# Patient Record
Sex: FEMALE | Race: ASIAN | Employment: OTHER | URBAN - METROPOLITAN AREA
[De-identification: names, ages, dates, MRNs, and addresses within clinical notes are randomized per-mention and may not be internally consistent; named-entity substitution may affect disease eponyms.]

---

## 2023-01-21 ENCOUNTER — HOSPITAL ENCOUNTER (OUTPATIENT)
Dept: ULTRASOUND IMAGING | Facility: HOSPITAL | Age: 78
Discharge: HOME | End: 2023-01-21

## 2023-01-21 DIAGNOSIS — I15.2 HYPERTENSION ASSOCIATED WITH DIABETES (HCC): ICD-10-CM

## 2023-01-21 DIAGNOSIS — E11.59 HYPERTENSION ASSOCIATED WITH DIABETES (HCC): ICD-10-CM

## 2024-06-05 ENCOUNTER — OFFICE VISIT (OUTPATIENT)
Dept: FAMILY MEDICINE CLINIC | Facility: CLINIC | Age: 79
End: 2024-06-05
Payer: COMMERCIAL

## 2024-06-05 VITALS
BODY MASS INDEX: 24.29 KG/M2 | OXYGEN SATURATION: 99 % | DIASTOLIC BLOOD PRESSURE: 60 MMHG | HEIGHT: 57 IN | HEART RATE: 60 BPM | SYSTOLIC BLOOD PRESSURE: 120 MMHG | WEIGHT: 112.6 LBS | TEMPERATURE: 97 F

## 2024-06-05 DIAGNOSIS — I10 HYPERTENSION, UNSPECIFIED TYPE: ICD-10-CM

## 2024-06-05 DIAGNOSIS — Z53.20 SCREENING FOR HEPATITIS C DECLINED: ICD-10-CM

## 2024-06-05 DIAGNOSIS — E11.9 TYPE 2 DIABETES MELLITUS WITHOUT COMPLICATION, UNSPECIFIED WHETHER LONG TERM INSULIN USE (HCC): Primary | ICD-10-CM

## 2024-06-05 PROCEDURE — 99213 OFFICE O/P EST LOW 20 MIN: CPT

## 2024-06-05 RX ORDER — GLIMEPIRIDE 2 MG/1
2 TABLET ORAL
COMMUNITY

## 2024-06-05 RX ORDER — DIPHENOXYLATE HYDROCHLORIDE AND ATROPINE SULFATE 2.5; .025 MG/1; MG/1
1 TABLET ORAL DAILY
COMMUNITY

## 2024-06-05 RX ORDER — NEBIVOLOL 10 MG/1
10 TABLET ORAL DAILY
COMMUNITY

## 2024-06-05 NOTE — ASSESSMENT & PLAN NOTE
Patient has a history of type 2 diabetes currently taking glimepiride  Last A1c unknown  -Patient does endorse that she feels increased thirst and wakes multiple times throughout the night to urinate    Plan:  Labs including CBC CMP and A1c ordered  Encouraged continued physical activity and dietary modifications   Continue glimepiride, patient has this medication from prior PCP  Referral to ophthalmology provided  Plan to follow-up in 3 months  -Foot exam and urine protein to be done

## 2024-06-05 NOTE — ASSESSMENT & PLAN NOTE
Patient has a history of hypertension controlled on nebivolol  In office Blood Pressure: 120/60    Plan:  Will attempt to obtain previous medical records  Continue nebivolol, patient currently has this medication from previous provider  Encouraged monitoring of home blood pressures with cuff  Plan to follow-up in 3 months

## 2024-06-05 NOTE — PROGRESS NOTES
Ambulatory Visit  Name: Carlene Cuenca      : 1945      MRN: [id number]  Encounter Provider: Brigid Patel DO  Encounter Date: 2024   Encounter department: [facility]    Assessment & Plan   1. Type 2 diabetes mellitus without complication, unspecified whether long term insulin use (HCC)  Assessment & Plan:  Patient has a history of type 2 diabetes currently taking glimepiride  -Patient does endorse that she feels increased thirst and wakes multiple times throughout the night to urinate    Plan:  Labs including CBC CMP and A1c ordered  Encouraged continued physical activity and dietary modifications   Continue glimepiride, patient has this medication from prior PCP  Referral to ophthalmology provided  Plan to follow-up in 3 months  -Foot exam and urine protein to be done  Orders:  -     CBC and differential; Future  -     Comprehensive metabolic panel; Future  -     Ambulatory Referral to Ophthalmology; Future  -     Hemoglobin A1C; Future  2. Hypertension, unspecified type  Assessment & Plan:  Patient has a history of hypertension controlled on nebivolol  In office Blood Pressure: 120/60    Plan:  Will attempt to obtain previous medical records  Continue nebivolol, patient currently has this medication from previous provider  Encouraged monitoring of home blood pressures with cuff  Plan to follow-up in 3 months    Orders:  -     CBC and differential; Future  -     Comprehensive metabolic panel; Future  3. Screening for hepatitis C declined       History of Present Illness     HPI  78-year-old female presents with her daughter to establish care.  She previously received care in [place] but has moved in with her daughter who lives locally.  She reports that she is overall in good health with a past medical history of diabetes and hypertension.  She walks 1 to 2 miles on most days and eats a vegetarian and primarily plant-based diet.  She does endorse that she always drinks  "a lot of water and wakes up 2-3 time each night to urinate.  Typically she sleeps 7-8 yours per night.     Her daughter also notes that the patient occasionally has a tremor in her left hand that typically occurs when she is anxious. It is not worse when picking up objects and does not disrupt her daily functioning. No associated weakness or numbness.     Dentist: Dentures, does not have established dentist  Optometrist: wears glasses      Review of Systems   Constitutional:  Negative for activity change, appetite change, fever and unexpected weight change.   Eyes:  Negative for visual disturbance.   Respiratory:  Negative for chest tightness and shortness of breath.    Cardiovascular:  Negative for chest pain, palpitations and leg swelling.   Gastrointestinal:  Negative for abdominal pain, constipation, diarrhea and vomiting.   Genitourinary:  Negative for dysuria.   Skin:  Negative for rash.   Neurological:  Negative for syncope.   Additional ROS per HPI above    Family Hx:  Mother - diabetes    Objective     /60 (BP Location: Left arm, Patient Position: Sitting, Cuff Size: Standard)   Pulse 60   Temp (!) 97 °F (36.1 °C) (Tympanic)   Ht 4' 9\" (1.448 m)   Wt 51.1 kg (112 lb 9.6 oz)   SpO2 99%   BMI 24.37 kg/m²     Physical Exam  Vitals reviewed.   Constitutional:       General: She is not in acute distress.     Appearance: She is not ill-appearing.   HENT:      Head: Normocephalic and atraumatic.      Left Ear: External ear normal.      Nose: Nose normal. No congestion or rhinorrhea.      Mouth/Throat:      Mouth: Mucous membranes are moist.      Pharynx: No oropharyngeal exudate or posterior oropharyngeal erythema.   Eyes:      General: No scleral icterus.        Right eye: No discharge.         Left eye: No discharge.      Extraocular Movements: Extraocular movements intact.      Conjunctiva/sclera: Conjunctivae normal.   Cardiovascular:      Rate and Rhythm: Normal rate and regular rhythm.      Heart " sounds: Normal heart sounds. No murmur heard.  Pulmonary:      Effort: Pulmonary effort is normal. No respiratory distress.      Breath sounds: Normal breath sounds. No stridor. No wheezing, rhonchi or rales.   Abdominal:      General: Bowel sounds are normal. There is no distension.      Palpations: Abdomen is soft.      Tenderness: There is no abdominal tenderness. There is no guarding or rebound.   Musculoskeletal:      Cervical back: Neck supple.      Right lower leg: No edema.      Left lower leg: No edema.   Skin:     General: Skin is warm and dry.      Capillary Refill: Capillary refill takes less than 2 seconds.      Findings: No rash.   Neurological:      Mental Status: She is alert and oriented to person, place, and time.   Psychiatric:         Mood and Affect: Mood normal.         Behavior: Behavior normal.       Administrative Statements   I have spent a total time of 30 minutes on 06/05/24 In caring for this patient including Patient and family education, Documenting in the medical record, Reviewing / ordering tests, medicine, procedures  , and Obtaining or reviewing history  .

## 2024-06-07 ENCOUNTER — APPOINTMENT (OUTPATIENT)
Dept: LAB | Facility: CLINIC | Age: 79
End: 2024-06-07
Payer: COMMERCIAL

## 2024-06-07 DIAGNOSIS — E11.9 TYPE 2 DIABETES MELLITUS WITHOUT COMPLICATION, UNSPECIFIED WHETHER LONG TERM INSULIN USE (HCC): ICD-10-CM

## 2024-06-07 DIAGNOSIS — I10 HYPERTENSION, UNSPECIFIED TYPE: ICD-10-CM

## 2024-06-07 LAB
ALBUMIN SERPL BCP-MCNC: 4 G/DL (ref 3.5–5)
ALP SERPL-CCNC: 69 U/L (ref 34–104)
ALT SERPL W P-5'-P-CCNC: 12 U/L (ref 7–52)
ANION GAP SERPL CALCULATED.3IONS-SCNC: 9 MMOL/L (ref 4–13)
AST SERPL W P-5'-P-CCNC: 20 U/L (ref 13–39)
BASOPHILS # BLD AUTO: 0.05 THOUSANDS/ÂΜL (ref 0–0.1)
BASOPHILS NFR BLD AUTO: 1 % (ref 0–1)
BILIRUB SERPL-MCNC: 0.66 MG/DL (ref 0.2–1)
BUN SERPL-MCNC: 22 MG/DL (ref 5–25)
CALCIUM SERPL-MCNC: 8.9 MG/DL (ref 8.4–10.2)
CHLORIDE SERPL-SCNC: 102 MMOL/L (ref 96–108)
CO2 SERPL-SCNC: 27 MMOL/L (ref 21–32)
CREAT SERPL-MCNC: 1.59 MG/DL (ref 0.6–1.3)
EOSINOPHIL # BLD AUTO: 0.32 THOUSAND/ÂΜL (ref 0–0.61)
EOSINOPHIL NFR BLD AUTO: 5 % (ref 0–6)
ERYTHROCYTE [DISTWIDTH] IN BLOOD BY AUTOMATED COUNT: 13.1 % (ref 11.6–15.1)
EST. AVERAGE GLUCOSE BLD GHB EST-MCNC: 166 MG/DL
GFR SERPL CREATININE-BSD FRML MDRD: 30 ML/MIN/1.73SQ M
GLUCOSE P FAST SERPL-MCNC: 198 MG/DL (ref 65–99)
HBA1C MFR BLD: 7.4 %
HCT VFR BLD AUTO: 35 % (ref 34.8–46.1)
HGB BLD-MCNC: 12.1 G/DL (ref 11.5–15.4)
IMM GRANULOCYTES # BLD AUTO: 0.02 THOUSAND/UL (ref 0–0.2)
IMM GRANULOCYTES NFR BLD AUTO: 0 % (ref 0–2)
LYMPHOCYTES # BLD AUTO: 2 THOUSANDS/ÂΜL (ref 0.6–4.47)
LYMPHOCYTES NFR BLD AUTO: 28 % (ref 14–44)
MCH RBC QN AUTO: 29.8 PG (ref 26.8–34.3)
MCHC RBC AUTO-ENTMCNC: 34.6 G/DL (ref 31.4–37.4)
MCV RBC AUTO: 86 FL (ref 82–98)
MONOCYTES # BLD AUTO: 0.5 THOUSAND/ÂΜL (ref 0.17–1.22)
MONOCYTES NFR BLD AUTO: 7 % (ref 4–12)
NEUTROPHILS # BLD AUTO: 4.25 THOUSANDS/ÂΜL (ref 1.85–7.62)
NEUTS SEG NFR BLD AUTO: 59 % (ref 43–75)
NRBC BLD AUTO-RTO: 0 /100 WBCS
PLATELET # BLD AUTO: 236 THOUSANDS/UL (ref 149–390)
PMV BLD AUTO: 10.9 FL (ref 8.9–12.7)
POTASSIUM SERPL-SCNC: 4.6 MMOL/L (ref 3.5–5.3)
PROT SERPL-MCNC: 6.4 G/DL (ref 6.4–8.4)
RBC # BLD AUTO: 4.06 MILLION/UL (ref 3.81–5.12)
SODIUM SERPL-SCNC: 138 MMOL/L (ref 135–147)
WBC # BLD AUTO: 7.14 THOUSAND/UL (ref 4.31–10.16)

## 2024-06-07 PROCEDURE — 80053 COMPREHEN METABOLIC PANEL: CPT

## 2024-06-07 PROCEDURE — 36415 COLL VENOUS BLD VENIPUNCTURE: CPT

## 2024-06-07 PROCEDURE — 83036 HEMOGLOBIN GLYCOSYLATED A1C: CPT

## 2024-06-07 PROCEDURE — 85025 COMPLETE CBC W/AUTO DIFF WBC: CPT

## 2024-06-11 ENCOUNTER — TELEPHONE (OUTPATIENT)
Dept: PEDIATRICS UNIT | Facility: HOSPITAL | Age: 79
End: 2024-06-11

## 2024-06-11 NOTE — TELEPHONE ENCOUNTER
Called patient and spoke with daughter about recent lab results. Discussed some dietary/lifestyle modifications that may reduce A1C and the potential for alterative medications. Also discussed monitoring kidney function overtime, as it now now appears at baseline. Will plan to follow up in 3 months with diabetic foot exam, Urine Alb:Cr and A1c.

## 2024-08-12 ENCOUNTER — CONSULT (OUTPATIENT)
Dept: FAMILY MEDICINE CLINIC | Facility: CLINIC | Age: 79
End: 2024-08-12
Payer: COMMERCIAL

## 2024-08-12 VITALS
TEMPERATURE: 98 F | RESPIRATION RATE: 20 BRPM | HEIGHT: 58 IN | HEART RATE: 63 BPM | DIASTOLIC BLOOD PRESSURE: 90 MMHG | SYSTOLIC BLOOD PRESSURE: 198 MMHG | WEIGHT: 114 LBS | OXYGEN SATURATION: 98 % | BODY MASS INDEX: 23.93 KG/M2

## 2024-08-12 DIAGNOSIS — Z13.220 SCREENING FOR LIPID DISORDERS: ICD-10-CM

## 2024-08-12 DIAGNOSIS — Z01.818 PRE-OP EXAMINATION: Primary | ICD-10-CM

## 2024-08-12 DIAGNOSIS — E11.9 TYPE 2 DIABETES MELLITUS WITHOUT COMPLICATION (HCC): ICD-10-CM

## 2024-08-12 DIAGNOSIS — I10 HYPERTENSION: ICD-10-CM

## 2024-08-12 DIAGNOSIS — Z01.818 PRE-OP EXAMINATION: ICD-10-CM

## 2024-08-12 PROCEDURE — 93000 ELECTROCARDIOGRAM COMPLETE: CPT | Performed by: FAMILY MEDICINE

## 2024-08-12 PROCEDURE — 99243 OFF/OP CNSLTJ NEW/EST LOW 30: CPT | Performed by: FAMILY MEDICINE

## 2024-08-12 RX ORDER — AMLODIPINE BESYLATE 5 MG/1
5 TABLET ORAL DAILY
Qty: 30 TABLET | Refills: 0 | Status: SHIPPED | OUTPATIENT
Start: 2024-08-12 | End: 2024-08-13 | Stop reason: SDUPTHER

## 2024-08-12 NOTE — PROGRESS NOTES
PRE-OPERATIVE EXAMINATION  Carlene Cuenca  1945    Carlene Cuenca is a 78 y.o. female with history of type 2 diabetes and hypertension who is planning to undergo cataract removal under IV sedation by Dr. Krysta Webster on 8/19/24. The procedure is indicated for the following condition: Right eye cataract. Patient has not had complications with anesthesia in the past. No prior surgeries, no hx of family reactions.     ROS:   Headaches: No  Chest pain: no   Shortness of breath: no  Shortness of breath with exertion: no  Orthopnea: no  Dizziness: no  Syncope: no  Unexplained weight change: no    PMH:  CAD: no  HTN: yes  CKD: no  DM: yes on insulin: no  History of CVA: no    She  reports that she has never smoked. She has never used smokeless tobacco. She reports that she does not drink alcohol.    There were no vitals taken for this visit.  Physical Exam  Vitals and nursing note reviewed.   Constitutional:       General: She is not in acute distress.     Appearance: She is well-developed.   HENT:      Head: Normocephalic and atraumatic.      Mouth/Throat:      Mouth: Mucous membranes are moist.      Pharynx: Oropharynx is clear.   Eyes:      Conjunctiva/sclera: Conjunctivae normal.   Cardiovascular:      Rate and Rhythm: Normal rate and regular rhythm.      Pulses:           Dorsalis pedis pulses are 2+ on the right side and 2+ on the left side.        Posterior tibial pulses are 2+ on the right side and 2+ on the left side.      Heart sounds: No murmur heard.  Pulmonary:      Effort: Pulmonary effort is normal. No respiratory distress.      Breath sounds: Normal breath sounds. No wheezing, rhonchi or rales.   Abdominal:      General: Bowel sounds are normal. There is no distension.      Palpations: Abdomen is soft.      Tenderness: There is no abdominal tenderness. There is no guarding or rebound.   Musculoskeletal:         General: No swelling.      Cervical back: Neck supple.      Right lower leg: No  edema.      Left lower leg: No edema.        Feet:    Feet:      Right foot:      Skin integrity: No ulcer, skin breakdown, erythema, warmth, callus or dry skin.      Left foot:      Skin integrity: No ulcer, skin breakdown, erythema, warmth, callus or dry skin.   Skin:     General: Skin is warm and dry.      Capillary Refill: Capillary refill takes less than 2 seconds.   Neurological:      Mental Status: She is alert.   Psychiatric:         Mood and Affect: Mood normal.   Diabetic Foot Exam    Patient's shoes and socks removed.    Right Foot/Ankle   Right Foot Inspection  Skin Exam: skin normal and skin intact. No dry skin, no warmth, no callus, no erythema, no maceration, no abnormal color, no pre-ulcer, no ulcer and no callus.     Toe Exam: No swelling and  no right toe deformity    Sensory   Vibration: intact  Monofilament testing: intact    Vascular  Capillary refills: < 3 seconds  The right DP pulse is 2+. The right PT pulse is 2+.     Left Foot/Ankle  Left Foot Inspection  Skin Exam: skin normal and skin intact. No dry skin, no warmth, no erythema, no maceration, normal color, no pre-ulcer, no ulcer and no callus.     Toe Exam: No swelling and no left toe deformity.     Sensory   Vibration: intact  Monofilament testing: intact    Vascular  Capillary refills: < 3 seconds  The left DP pulse is 2+. The left PT pulse is 2+.     Assign Risk Category  Risk: 0 (no deficits in monofilament detection)        Revised Cardiac Risk Index (RCRI) for Pre-Operative Risk   (estimates risk of cardiac complications after noncardiac surgery)    High-risk surgery: No 0 / Yes +1  Intraperitoneal, intrathoracic, suprainguinal vascular  History of ischemic heart disease: No 0 / Yes +1  Hx of MI, (+) exercise test, current chest pain considered due to myocardial ischemia, use of nitrate therapy or ECG with pathological Q waves)  History of CHF: No 0 / Yes +1  Pulmonary edema, B/L rales or S3 gallop; SOTO, orthopnea, PND, CXR showing  pulmonary vascular redistribution)  History of cerebrovascular disease: No 0 / Yes +1  Prior TIA or stroke  Pre-operative treatment with insulin: No 0 / Yes +1  Pre-operative creatinine >2 mg/dL: No 0 / Yes +1    RCRI Scorin points: Class I Risk, 3.9% 30-day risk of death, MI, or cardiac arrest  1 point: Class II Risk, 6.0% 30-day risk of death, MI, or cardiac arrest  2 points: Class III Risk, 10.1% 30-day risk of death, MI, or cardiac arrest  3 points: Class IV Risk, 15% 30-day risk of death, MI, or cardiac arrest  4 points: Class IV Risk, 15% 30-day risk of death, MI, or cardiac arrest  5 points: Class IV Risk, 15% 30-day risk of death, MI, or cardiac arrest  6 points: Class IV Risk, 15% 30-day risk of death, MI, or cardiac arrest    Lab Results   Component Value Date    CREATININE 1.59 (H) 2024       Diagnoses and all orders for this visit:    Pre-op examination    Type 2 diabetes mellitus without complication (HCC)    Hypertension    Screening for lipid disorders      Assessment and plan:  Pre- op examination    Type 2 Diabetes Mellitus without complication  Currently taking glimepiride 2 mg daily        Lab Results   Component Value Date     HGBA1C 7.4 (H) 2024      Plan:  Labs ordered today: CMP, alb/cr ratio  Patient may benefit from metformin vs SGLT2i due to concerns for hypoglycemia   Diabetic foot exam performed in office today      Hypertension  Controlled on nebivolol 10 mg daily  In office Blood Pressure: (!) 198/90  -Patient asymptomatic  -EKG showing NSR with no signs of ischemia     Plan:  Continue nebivolol at this time  Amlodipine 5 mg daily  Follow-up in 4 days for blood pressure recheck  Encourage monitoring BP with home cuff      Screening for Lipid disorders  - Lipid panel      Recommendations:  Carlene Cuenca is undergoing an elective Low Risk surgery, cataract removal. She is RCRI class I risk (0 points) with 3.9% 30-day risk of death, MI, or cardiac arrest.  Per patient  was noted to have significant hypertension in the office today.  She was started on amlodipine 5 mg daily with plans to follow-up in 4 days.  He is to complete preop blood work interval.    Discussed with Dr. Gordon, who is in agreement with the plan as outlined above.

## 2024-08-12 NOTE — PATIENT INSTRUCTIONS
Please complete blood work tomorrow. This is fasting blood work.   Please start amlodipine 5 mg daily  Please follow up in office on Thursday afternoon or Friday morning to discuss lab work and pre-operative risk

## 2024-08-12 NOTE — ASSESSMENT & PLAN NOTE
Currently taking glimepiride 2 mg daily  Lab Results   Component Value Date    HGBA1C 7.4 (H) 06/07/2024     Plan:  Labs ordered today: BMP, alb/cr ratio  Patient may benefit from metformin vs SGLT2i due to concerns for hypoglycemia

## 2024-08-13 ENCOUNTER — TELEPHONE (OUTPATIENT)
Dept: FAMILY MEDICINE CLINIC | Facility: CLINIC | Age: 79
End: 2024-08-13

## 2024-08-13 ENCOUNTER — APPOINTMENT (OUTPATIENT)
Dept: LAB | Facility: CLINIC | Age: 79
End: 2024-08-13
Payer: COMMERCIAL

## 2024-08-13 DIAGNOSIS — Z01.818 PRE-OP EXAMINATION: ICD-10-CM

## 2024-08-13 DIAGNOSIS — E11.9 TYPE 2 DIABETES MELLITUS WITHOUT COMPLICATION (HCC): ICD-10-CM

## 2024-08-13 DIAGNOSIS — Z13.220 SCREENING FOR LIPID DISORDERS: ICD-10-CM

## 2024-08-13 LAB
ALBUMIN SERPL BCG-MCNC: 4 G/DL (ref 3.5–5)
ALP SERPL-CCNC: 70 U/L (ref 34–104)
ALT SERPL W P-5'-P-CCNC: 10 U/L (ref 7–52)
ANION GAP SERPL CALCULATED.3IONS-SCNC: 8 MMOL/L (ref 4–13)
AST SERPL W P-5'-P-CCNC: 13 U/L (ref 13–39)
BILIRUB SERPL-MCNC: 0.62 MG/DL (ref 0.2–1)
BUN SERPL-MCNC: 19 MG/DL (ref 5–25)
CALCIUM SERPL-MCNC: 9.1 MG/DL (ref 8.4–10.2)
CHLORIDE SERPL-SCNC: 102 MMOL/L (ref 96–108)
CHOLEST SERPL-MCNC: 172 MG/DL
CO2 SERPL-SCNC: 28 MMOL/L (ref 21–32)
CREAT SERPL-MCNC: 1.46 MG/DL (ref 0.6–1.3)
CREAT UR-MCNC: 30.7 MG/DL
GFR SERPL CREATININE-BSD FRML MDRD: 34 ML/MIN/1.73SQ M
GLUCOSE P FAST SERPL-MCNC: 90 MG/DL (ref 65–99)
HDLC SERPL-MCNC: 36 MG/DL
LDLC SERPL CALC-MCNC: 95 MG/DL (ref 0–100)
MICROALBUMIN UR-MCNC: 84.4 MG/L
MICROALBUMIN/CREAT 24H UR: 275 MG/G CREATININE (ref 0–30)
POTASSIUM SERPL-SCNC: 4.1 MMOL/L (ref 3.5–5.3)
PROT SERPL-MCNC: 6.6 G/DL (ref 6.4–8.4)
SODIUM SERPL-SCNC: 138 MMOL/L (ref 135–147)
TRIGL SERPL-MCNC: 206 MG/DL

## 2024-08-13 PROCEDURE — 80053 COMPREHEN METABOLIC PANEL: CPT

## 2024-08-13 PROCEDURE — 36415 COLL VENOUS BLD VENIPUNCTURE: CPT

## 2024-08-13 PROCEDURE — 82570 ASSAY OF URINE CREATININE: CPT

## 2024-08-13 PROCEDURE — 82043 UR ALBUMIN QUANTITATIVE: CPT

## 2024-08-13 PROCEDURE — 80061 LIPID PANEL: CPT

## 2024-08-13 RX ORDER — AMLODIPINE BESYLATE 5 MG/1
5 TABLET ORAL DAILY
Qty: 30 TABLET | Refills: 0 | Status: SHIPPED | OUTPATIENT
Start: 2024-08-13 | End: 2024-08-14 | Stop reason: SDUPTHER

## 2024-08-13 RX ORDER — AMLODIPINE BESYLATE 5 MG/1
5 TABLET ORAL DAILY
Qty: 30 TABLET | Refills: 0 | OUTPATIENT
Start: 2024-08-13

## 2024-08-13 NOTE — TELEPHONE ENCOUNTER
Encounter for forms      Patient requires a form to be completed.  Patient is aware of 7-10 day turn around time.    Please refer to the following information:       Type of Form: [facility] for [facility]     Date of Visit (if applicable):     Doctor: dr cooper     Expected date: timely manner     How patient would like to receive form:when completed please fax    Fax number: [phone]    Patient phone number:       Copy scanned to encounter. Copy provided to patient. Original in (X) team folder to be completed.

## 2024-08-13 NOTE — TELEPHONE ENCOUNTER
Pts daughter called refill line stating that pharmacy told her Dr. Patel is not covered as a prescriber under her insurance. Can another provider in the office call this script in?    Isamar states she will wait around at the pharmacy for another 5 minutes and if it isn't call in before she leaves she will wait for the pharmacy to contact her

## 2024-08-13 NOTE — TELEPHONE ENCOUNTER
Patient daughter informed at this time I was informed by daughter they could not fill medication due to medicare enrollment. Of dr cooper. I gave attending name and it went thru daughter made aware.

## 2024-08-13 NOTE — TELEPHONE ENCOUNTER
I am following up patient had blood work completed prior to start of amlodipine start for today. Creatinine is 1.46 I wanted to schedk if patient needed to be notiifed to start the med after bw completion. Please review and provide input. Thank you

## 2024-08-14 DIAGNOSIS — Z01.818 PRE-OP EXAMINATION: ICD-10-CM

## 2024-08-14 RX ORDER — AMLODIPINE BESYLATE 5 MG/1
5 TABLET ORAL DAILY
Qty: 30 TABLET | Refills: 0 | Status: SHIPPED | OUTPATIENT
Start: 2024-08-14 | End: 2024-08-15

## 2024-08-15 ENCOUNTER — OFFICE VISIT (OUTPATIENT)
Dept: FAMILY MEDICINE CLINIC | Facility: CLINIC | Age: 79
End: 2024-08-15
Payer: COMMERCIAL

## 2024-08-15 VITALS
RESPIRATION RATE: 20 BRPM | OXYGEN SATURATION: 99 % | DIASTOLIC BLOOD PRESSURE: 82 MMHG | SYSTOLIC BLOOD PRESSURE: 198 MMHG | TEMPERATURE: 98 F | BODY MASS INDEX: 24.24 KG/M2 | HEART RATE: 66 BPM | WEIGHT: 114 LBS

## 2024-08-15 DIAGNOSIS — N18.32 STAGE 3B CHRONIC KIDNEY DISEASE (HCC): ICD-10-CM

## 2024-08-15 DIAGNOSIS — I10 PRIMARY HYPERTENSION: Primary | ICD-10-CM

## 2024-08-15 PROCEDURE — 99213 OFFICE O/P EST LOW 20 MIN: CPT

## 2024-08-15 RX ORDER — AMLODIPINE BESYLATE 10 MG/1
10 TABLET ORAL DAILY
Qty: 30 TABLET | Refills: 2 | Status: SHIPPED | OUTPATIENT
Start: 2024-08-15

## 2024-08-15 NOTE — ASSESSMENT & PLAN NOTE
Seen 3d ago with BP comparable to today's 198/82. At the time pt was taking on nebivolol 10mg QD. She was started on amlodipine 5mg which she took that night and has been taking daily since. She does drink 1 cup of coffee every morning. Pt's daughter reports they have a BP cuff at home if needed.  - Increase amlodipine to 10mg QD. Discussed potential side effects alejandro potential for lightheadedness if BP corrects rapidly  - Check BP first thing in the AM every AM over the next week and write the values down to bring to follow up  - Return within 1 week for BP recheck  - Pt advised to delay cataract surgery with currently uncontrolled HTN. Preop paperwork to be sent once BP controlled

## 2024-08-15 NOTE — ASSESSMENT & PLAN NOTE
Lab Results   Component Value Date    EGFR 34 08/13/2024    EGFR 30 06/07/2024    CREATININE 1.46 (H) 08/13/2024    CREATININE 1.59 (H) 06/07/2024   eGFR persistently in 30s, in setting of diabetes and uncontrolled HTN.  - Referral to establish with nephrology

## 2024-08-15 NOTE — PROGRESS NOTES
Ambulatory Visit  Name: Carlene Cuenca      : 1945      MRN: [id number]  Encounter Provider: Celestine Alcala MD  Encounter Date: 8/15/2024   Encounter department: [facility]    Assessment & Plan   1. Primary hypertension  Assessment & Plan:  Seen 3d ago with BP comparable to today's 198/82. At the time pt was taking on nebivolol 10mg QD. She was started on amlodipine 5mg which she took that night and has been taking daily since. She does drink 1 cup of coffee every morning. Pt's daughter reports they have a BP cuff at home if needed.  - Increase amlodipine to 10mg QD. Discussed potential side effects alejandro potential for lightheadedness if BP corrects rapidly  - Check BP first thing in the AM every AM over the next week and write the values down to bring to follow up  - Return within 1 week for BP recheck  - Pt advised to delay cataract surgery with currently uncontrolled HTN. Preop paperwork to be sent once BP controlled  Orders:  -     amLODIPine (NORVASC) 10 mg tablet; Take 1 tablet (10 mg total) by mouth daily  2. Stage 3b chronic kidney disease (HCC)  Assessment & Plan:  Lab Results   Component Value Date    EGFR 34 2024    EGFR 30 2024    CREATININE 1.46 (H) 2024    CREATININE 1.59 (H) 2024   eGFR persistently in 30s, in setting of diabetes and uncontrolled HTN.  - Referral to establish with nephrology  Orders:  -     Ambulatory Referral to Nephrology; Future       History of Present Illness     HPI  Presenting for BP recheck following preop exam earlier this week.  Took something other than nebivolol before in , believes it is amlodipine. Been on nebivolol for 8 months. Amlodpinine 5 started 2d ago.    Review of Systems   Constitutional:  Negative for chills and fever.   HENT:  Negative for ear pain and sore throat.    Eyes:  Negative for pain and visual disturbance.   Respiratory:  Negative for cough and shortness of breath.    Cardiovascular:   Negative for chest pain and palpitations.   Gastrointestinal:  Negative for abdominal pain and vomiting.   Genitourinary:  Negative for dysuria and hematuria.   Musculoskeletal:  Negative for arthralgias and back pain.   Skin:  Negative for color change and rash.   Neurological:  Negative for seizures and syncope.     Medical History Reviewed by provider this encounter:       Past Medical History   No past medical history on file.  No past surgical history on file.  No family history on file.  Current Outpatient Medications on File Prior to Visit   Medication Sig Dispense Refill    glimepiride (AMARYL) 2 mg tablet Take 2 mg by mouth every morning before breakfast      multivitamin (THERAGRAN) TABS Take 1 tablet by mouth daily      nebivolol (BYSTOLIC) 10 mg tablet Take 10 mg by mouth daily 2 times a day      [DISCONTINUED] amLODIPine (NORVASC) 5 mg tablet Take 1 tablet (5 mg total) by mouth daily 30 tablet 0     No current facility-administered medications on file prior to visit.   No Known Allergies   Current Outpatient Medications on File Prior to Visit   Medication Sig Dispense Refill    glimepiride (AMARYL) 2 mg tablet Take 2 mg by mouth every morning before breakfast      multivitamin (THERAGRAN) TABS Take 1 tablet by mouth daily      nebivolol (BYSTOLIC) 10 mg tablet Take 10 mg by mouth daily 2 times a day      [DISCONTINUED] amLODIPine (NORVASC) 5 mg tablet Take 1 tablet (5 mg total) by mouth daily 30 tablet 0     No current facility-administered medications on file prior to visit.      Social History     Tobacco Use    Smoking status: Never     Passive exposure: Never    Smokeless tobacco: Never   Vaping Use    Vaping status: Never Used   Substance and Sexual Activity    Alcohol use: Never    Drug use: Not on file    Sexual activity: Not on file     Objective     BP (!) 198/82 (BP Location: Left arm, Patient Position: Sitting, Cuff Size: Standard)   Pulse 66   Temp 98 °F (36.7 °C)   Resp 20   Wt 51.7 kg  (114 lb)   SpO2 99%   BMI 24.24 kg/m²     Physical Exam  Vitals and nursing note reviewed.   Constitutional:       General: She is not in acute distress.     Appearance: She is well-developed.   HENT:      Head: Normocephalic and atraumatic.   Eyes:      Conjunctiva/sclera: Conjunctivae normal.   Cardiovascular:      Rate and Rhythm: Normal rate and regular rhythm.      Heart sounds: No murmur heard.  Pulmonary:      Effort: Pulmonary effort is normal. No respiratory distress.      Breath sounds: Normal breath sounds.   Abdominal:      General: Abdomen is flat. There is no distension.   Musculoskeletal:         General: No swelling.   Skin:     General: Skin is warm and dry.      Capillary Refill: Capillary refill takes less than 2 seconds.   Neurological:      Mental Status: She is alert.   Psychiatric:         Mood and Affect: Mood normal.       Administrative Statements   I have spent a total time of 20 minutes in caring for this patient on the day of the visit/encounter including Instructions for management, Patient and family education, Counseling / Coordination of care, Documenting in the medical record, and Obtaining or reviewing history  .

## 2024-08-23 ENCOUNTER — TELEPHONE (OUTPATIENT)
Age: 79
End: 2024-08-23

## 2024-08-23 DIAGNOSIS — I10 PRIMARY HYPERTENSION: ICD-10-CM

## 2024-08-23 NOTE — TELEPHONE ENCOUNTER
Pt's daughter calling regarding rx of amLODIpine; stated she is at pharmacy and they advised her the the prescriber's NPI is not found.    Please have an attending physician resend rx through to [facility]/pharmacy #[id number] - [place] [address] [phone] and contact pt's daughter to advise once sent.

## 2024-08-26 ENCOUNTER — OFFICE VISIT (OUTPATIENT)
Dept: FAMILY MEDICINE CLINIC | Facility: CLINIC | Age: 79
End: 2024-08-26

## 2024-08-26 VITALS
OXYGEN SATURATION: 99 % | BODY MASS INDEX: 25.18 KG/M2 | DIASTOLIC BLOOD PRESSURE: 74 MMHG | HEART RATE: 70 BPM | WEIGHT: 118.4 LBS | TEMPERATURE: 98.7 F | RESPIRATION RATE: 20 BRPM | SYSTOLIC BLOOD PRESSURE: 180 MMHG

## 2024-08-26 DIAGNOSIS — I10 PRIMARY HYPERTENSION: Primary | ICD-10-CM

## 2024-08-26 DIAGNOSIS — M79.89 LEG SWELLING: ICD-10-CM

## 2024-08-26 DIAGNOSIS — N18.32 STAGE 3B CHRONIC KIDNEY DISEASE (HCC): ICD-10-CM

## 2024-08-26 RX ORDER — AMLODIPINE BESYLATE 10 MG/1
10 TABLET ORAL DAILY
Qty: 30 TABLET | Refills: 2 | Status: SHIPPED | OUTPATIENT
Start: 2024-08-26 | End: 2024-08-27

## 2024-08-26 RX ORDER — HYDROCHLOROTHIAZIDE 25 MG/1
25 TABLET ORAL DAILY
Qty: 90 TABLET | Refills: 1 | Status: SHIPPED | OUTPATIENT
Start: 2024-08-26

## 2024-08-26 NOTE — PROGRESS NOTES
Ambulatory Visit  Name: Carlene Cuenca      : 1945      MRN: [id number]  Encounter Provider: Chris Martinez DO  Encounter Date: 2024   Encounter department: [facility]    Assessment & Plan   1. Primary hypertension  Assessment & Plan:  Poor control blood pressure in office today 180/74.  Patient is currently taking nebivolol 10 mg daily.  As well as amlodipine 5 mg daily for the last few days.  For about 1 week patient was taking amlodipine 10 mg daily which she immediately noticed leg swelling which has improved since dropping back down to 5 mg daily.    Continue taking amlodipine 5 mg daily and nebivolol 10 mg daily  Add hydrochlorothiazide 25 mg daily  Repeat CMP in 2 weeks and follow-up in office for blood pressure recheck  Orders:  -     Comprehensive metabolic panel; Future  -     hydroCHLOROthiazide 25 mg tablet; Take 1 tablet (25 mg total) by mouth daily  -     amLODIPine (NORVASC) 10 mg tablet; Take 0.5 tablets (5 mg total) by mouth daily  2. Stage 3b chronic kidney disease (HCC)  Assessment & Plan:  Lab Results   Component Value Date    EGFR 34 2024    EGFR 30 2024    CREATININE 1.46 (H) 2024    CREATININE 1.59 (H) 2024     Calculated Creatine clearance 27ml/min    Patient is on the threshold for initiating ace/arb (would ideally like gfr greater than 30). Patient has scheduled establish care with nephrology, provided number for scheduling again. Uncontolled hypertension group attributing as well see above.    Creatinine clearance is greater than 10 so will initiate hydrochlorothiazide at this time for better blood pressure control.    Repeat CMP before follow-up visit to ensure no change in GFR/creatinine clearance  Orders:  -     Comprehensive metabolic panel; Future  3. Leg swelling  Comments:  patient notes leg swelling begining on 24. wiliam due to increase in amlodipine see above       History of Present Illness     Patient  presents to the office with leg swelling that has been ongoing for approximately 1 week.  Patient notes this started approximately few days after she started the increased dose of amlodipine at 10 mg daily.  Patient states that a few days ago she also noticed hand swelling and facial swelling which has resolved.  Leg swelling has been persistent throughout  The course.  Patient denies any chest pain, shortness of breath, dizziness, lightheadedness.  Patient denies any allergies or coming in contact with any substances.  Patient denies any itching.  Over the last few days patient has gone back to her original amlodipine dose of 5 mg which remote mild improvement of leg swelling as well as resolution of her hand and facial swelling.  Patient has not trialed any interventions or medications for the swelling        Review of Systems   Constitutional:  Negative for chills, fatigue and fever.   Respiratory:  Negative for chest tightness, shortness of breath and wheezing.    Cardiovascular:  Positive for leg swelling. Negative for chest pain.   Gastrointestinal:  Negative for abdominal pain, blood in stool, diarrhea and nausea.   Genitourinary:  Negative for difficulty urinating and dysuria.   Neurological:  Negative for dizziness, weakness and light-headedness.   Psychiatric/Behavioral:  Negative for behavioral problems and suicidal ideas.      Pertinent Medical History     Medical History Reviewed by provider this encounter:  Tobacco  Allergies  Meds  Problems  Med Hx  Surg Hx  Fam Hx       Past Medical History   History reviewed. No pertinent past medical history.  History reviewed. No pertinent surgical history.  History reviewed. No pertinent family history.  Current Outpatient Medications on File Prior to Visit   Medication Sig Dispense Refill    glimepiride (AMARYL) 2 mg tablet Take 2 mg by mouth every morning before breakfast      multivitamin (THERAGRAN) TABS Take 1 tablet by mouth daily      nebivolol  (BYSTOLIC) 10 mg tablet Take 10 mg by mouth daily 2 times a day      [DISCONTINUED] amLODIPine (NORVASC) 10 mg tablet Take 1 tablet (10 mg total) by mouth daily 30 tablet 2     No current facility-administered medications on file prior to visit.   No Known Allergies   Current Outpatient Medications on File Prior to Visit   Medication Sig Dispense Refill    glimepiride (AMARYL) 2 mg tablet Take 2 mg by mouth every morning before breakfast      multivitamin (THERAGRAN) TABS Take 1 tablet by mouth daily      nebivolol (BYSTOLIC) 10 mg tablet Take 10 mg by mouth daily 2 times a day      [DISCONTINUED] amLODIPine (NORVASC) 10 mg tablet Take 1 tablet (10 mg total) by mouth daily 30 tablet 2     No current facility-administered medications on file prior to visit.      Social History     Tobacco Use    Smoking status: Never     Passive exposure: Never    Smokeless tobacco: Never   Vaping Use    Vaping status: Never Used   Substance and Sexual Activity    Alcohol use: Never    Drug use: Not on file    Sexual activity: Not on file     Objective     BP (!) 180/74 (BP Location: Left arm, Patient Position: Sitting, Cuff Size: Large)   Pulse 70   Temp 98.7 °F (37.1 °C)   Resp 20   Wt 53.7 kg (118 lb 6.4 oz)   SpO2 99%   BMI 25.18 kg/m²     Physical Exam  Constitutional:       General: She is not in acute distress.     Appearance: She is not ill-appearing or toxic-appearing.   HENT:      Head: Normocephalic and atraumatic.      Right Ear: External ear normal.      Left Ear: External ear normal.      Nose: No congestion or rhinorrhea.      Mouth/Throat:      Pharynx: No oropharyngeal exudate or posterior oropharyngeal erythema.   Eyes:      Conjunctiva/sclera: Conjunctivae normal.   Cardiovascular:      Rate and Rhythm: Normal rate and regular rhythm.      Pulses: Normal pulses.      Heart sounds: Normal heart sounds. No murmur heard.  Pulmonary:      Effort: Pulmonary effort is normal. No respiratory distress.      Breath  sounds: Normal breath sounds. No wheezing or rales.   Abdominal:      General: Bowel sounds are normal. There is no distension.      Palpations: Abdomen is soft.      Tenderness: There is no abdominal tenderness. There is no guarding.   Musculoskeletal:      Right lower leg: Edema present.      Left lower leg: Edema present.   Skin:     General: Skin is warm.      Capillary Refill: Capillary refill takes less than 2 seconds.   Neurological:      General: No focal deficit present.      Mental Status: She is alert and oriented to person, place, and time. Mental status is at baseline.   Psychiatric:         Mood and Affect: Mood normal.         Behavior: Behavior normal.       Administrative Statements   I have spent a total time of 30 minutes in caring for this patient on the day of the visit/encounter including Risks and benefits of tx options, Impressions, Counseling / Coordination of care, Documenting in the medical record, Reviewing / ordering tests, medicine, procedures  , Obtaining or reviewing history  , and Communicating with other healthcare professionals .    Chris Martinez, DO  PGY-2 Family Medicine

## 2024-08-27 ENCOUNTER — TELEPHONE (OUTPATIENT)
Age: 79
End: 2024-08-27

## 2024-08-27 RX ORDER — AMLODIPINE BESYLATE 10 MG/1
5 TABLET ORAL DAILY
Qty: 30 TABLET | Refills: 2 | Status: SHIPPED | OUTPATIENT
Start: 2024-08-27 | End: 2024-08-30 | Stop reason: SDUPTHER

## 2024-08-27 NOTE — TELEPHONE ENCOUNTER
Patient's Daughter calling in :  As per last OV note Amlodipine should be 5 mg. Not 10 mg. If dosage can be changed .   States the pharmacy is having issues getting it approved through medicaid . Stating it may be because it was ordered by a resident and not an attending.     Please assist.

## 2024-08-27 NOTE — ASSESSMENT & PLAN NOTE
Lab Results   Component Value Date    EGFR 34 08/13/2024    EGFR 30 06/07/2024    CREATININE 1.46 (H) 08/13/2024    CREATININE 1.59 (H) 06/07/2024     Calculated Creatine clearance 27ml/min    Patient is on the threshold for initiating ace/arb (would ideally like gfr greater than 30). Patient has scheduled establish care with nephrology, provided number for scheduling again. Uncontolled hypertension group attributing as well see above.    Creatinine clearance is greater than 10 so will initiate hydrochlorothiazide at this time for better blood pressure control.    Repeat CMP before follow-up visit to ensure no change in GFR/creatinine clearance

## 2024-08-27 NOTE — ASSESSMENT & PLAN NOTE
Poor control blood pressure in office today 180/74.  Patient is currently taking nebivolol 10 mg daily.  As well as amlodipine 5 mg daily for the last few days.  For about 1 week patient was taking amlodipine 10 mg daily which she immediately noticed leg swelling which has improved since dropping back down to 5 mg daily.    Continue taking amlodipine 5 mg daily and nebivolol 10 mg daily  Add hydrochlorothiazide 25 mg daily  Repeat CMP in 2 weeks and follow-up in office for blood pressure recheck

## 2024-08-28 NOTE — TELEPHONE ENCOUNTER
I left message for daughter confirming correct amlodipine dose of 5 mg. I have also called the [facility] with an attending name but they were closed I told them to call back if additional info is needed.   My question to daughter is does she need additional amlodipine since dose was decreased she could split the pill.

## 2024-08-29 ENCOUNTER — TELEPHONE (OUTPATIENT)
Age: 79
End: 2024-08-29

## 2024-08-29 NOTE — TELEPHONE ENCOUNTER
Daughter called asking if a new prescription can be sent to the pharmacy for amLODIPine  5 mg and to be signed by an attending provider so medicaid could cover it. They did not  the 10 mg prescription sent on 08/27/24. Please advise and contact pt.

## 2024-08-30 DIAGNOSIS — I10 PRIMARY HYPERTENSION: ICD-10-CM

## 2024-08-30 RX ORDER — AMLODIPINE BESYLATE 5 MG/1
5 TABLET ORAL DAILY
Qty: 90 TABLET | Refills: 2 | Status: SHIPPED | OUTPATIENT
Start: 2024-08-30

## 2024-09-04 NOTE — TELEPHONE ENCOUNTER
Patient daughter called and stated that when she picked up the 5mg medication the pharmacy only gave them 10 pills. She hasn't had any to take since Sunday and is asking for a new prescription to be sent to the pharmacy.      She is also questioning on whether or not patient should be taking the hydroCHLOROthiazide 25 mg tablet as patient did stop taking it because it was giving her stomach cramps but patient daughter reported that patient's blood pressure has been as high as 199. If she is not to take the hydroCHLOROthiazide 25 mg tablet is there another medication that could/should be prescribed in place of it.    Please advise and call patient daughter.

## 2024-09-04 NOTE — TELEPHONE ENCOUNTER
I spoke with the patient daughter and I went over symptoms warranting ER visit if BP remains in the 199 range. I also suggested she take the meds as prescribed. I am not sure why according to the patient daughter that the pharmacy stated the patient amlodipine was not avaialble. I called them and they ran it thru and stated it was ready for  in 1 hour. I did tell the daughter to  this evening. In regard to the leg cramping daughter states from the HCTZ I did suggest a sugarfree gatorade possibly once a day. I told her I would send this to the provider to determine if an adjustment needs to be made. I also stressed the importance of taking all meds as prescribed since the patient BP is very high. Daughter assures me her mom is doing fine and they do note her BP during the day to have fluctuations and goes down to 160 170 systolic. I did tell her to write down 2 blood pressures a day so we can gauge what is happening . I will ask the provider to please review.I will check in with patient daughter on Friday and I have told her to call us with any changes and to go to ER with emergent symptoms. Daughter Isamar is agreeable.

## 2024-09-05 NOTE — TELEPHONE ENCOUNTER
Please have patient continue the amlodipine and HCTZ as prescribed and follow up with resident as scheduled on 9/9/24

## 2024-09-06 NOTE — TELEPHONE ENCOUNTER
Daughter called to give an update on the pt's b/p.   This morning at 8am it was 180/75; after taking all medications at 11am it was 166/76. Please call daughter if needed.

## 2024-09-07 ENCOUNTER — APPOINTMENT (OUTPATIENT)
Dept: LAB | Facility: HOSPITAL | Age: 79
End: 2024-09-07
Payer: COMMERCIAL

## 2024-09-07 DIAGNOSIS — N18.32 STAGE 3B CHRONIC KIDNEY DISEASE (HCC): ICD-10-CM

## 2024-09-07 DIAGNOSIS — I10 PRIMARY HYPERTENSION: ICD-10-CM

## 2024-09-07 LAB
ALBUMIN SERPL BCG-MCNC: 4.2 G/DL (ref 3.5–5)
ALP SERPL-CCNC: 70 U/L (ref 34–104)
ALT SERPL W P-5'-P-CCNC: 12 U/L (ref 7–52)
ANION GAP SERPL CALCULATED.3IONS-SCNC: 5 MMOL/L (ref 4–13)
AST SERPL W P-5'-P-CCNC: 14 U/L (ref 13–39)
BILIRUB SERPL-MCNC: 0.61 MG/DL (ref 0.2–1)
BUN SERPL-MCNC: 23 MG/DL (ref 5–25)
CALCIUM SERPL-MCNC: 9.4 MG/DL (ref 8.4–10.2)
CHLORIDE SERPL-SCNC: 97 MMOL/L (ref 96–108)
CO2 SERPL-SCNC: 28 MMOL/L (ref 21–32)
CREAT SERPL-MCNC: 1.46 MG/DL (ref 0.6–1.3)
GFR SERPL CREATININE-BSD FRML MDRD: 34 ML/MIN/1.73SQ M
GLUCOSE P FAST SERPL-MCNC: 106 MG/DL (ref 65–99)
POTASSIUM SERPL-SCNC: 4.7 MMOL/L (ref 3.5–5.3)
PROT SERPL-MCNC: 7 G/DL (ref 6.4–8.4)
SODIUM SERPL-SCNC: 130 MMOL/L (ref 135–147)

## 2024-09-07 PROCEDURE — 36415 COLL VENOUS BLD VENIPUNCTURE: CPT

## 2024-09-07 PROCEDURE — 80053 COMPREHEN METABOLIC PANEL: CPT

## 2024-09-09 ENCOUNTER — TELEPHONE (OUTPATIENT)
Age: 79
End: 2024-09-09

## 2024-09-09 ENCOUNTER — OFFICE VISIT (OUTPATIENT)
Dept: FAMILY MEDICINE CLINIC | Facility: CLINIC | Age: 79
End: 2024-09-09

## 2024-09-09 VITALS
HEART RATE: 64 BPM | RESPIRATION RATE: 20 BRPM | BODY MASS INDEX: 24.67 KG/M2 | OXYGEN SATURATION: 100 % | DIASTOLIC BLOOD PRESSURE: 89 MMHG | WEIGHT: 116 LBS | SYSTOLIC BLOOD PRESSURE: 170 MMHG | TEMPERATURE: 98.5 F

## 2024-09-09 DIAGNOSIS — E11.9 TYPE 2 DIABETES MELLITUS WITHOUT COMPLICATION, WITHOUT LONG-TERM CURRENT USE OF INSULIN (HCC): ICD-10-CM

## 2024-09-09 DIAGNOSIS — I10 PRIMARY HYPERTENSION: Primary | ICD-10-CM

## 2024-09-09 DIAGNOSIS — N18.32 STAGE 3B CHRONIC KIDNEY DISEASE (HCC): ICD-10-CM

## 2024-09-09 LAB — SL AMB POCT HEMOGLOBIN AIC: 8 (ref ?–6.5)

## 2024-09-09 RX ORDER — BLOOD-GLUCOSE METER
KIT MISCELLANEOUS
Qty: 1 KIT | Refills: 0 | Status: SHIPPED | OUTPATIENT
Start: 2024-09-09 | End: 2024-09-09 | Stop reason: SDUPTHER

## 2024-09-09 RX ORDER — BLOOD SUGAR DIAGNOSTIC
STRIP MISCELLANEOUS
Qty: 100 STRIP | Refills: 3 | OUTPATIENT
Start: 2024-09-09

## 2024-09-09 RX ORDER — BLOOD-GLUCOSE METER
KIT MISCELLANEOUS
Qty: 1 KIT | Refills: 0 | Status: SHIPPED | OUTPATIENT
Start: 2024-09-09

## 2024-09-09 RX ORDER — BLOOD SUGAR DIAGNOSTIC
STRIP MISCELLANEOUS
Qty: 100 EACH | Refills: 3 | Status: SHIPPED | OUTPATIENT
Start: 2024-09-09 | End: 2024-09-09 | Stop reason: SDUPTHER

## 2024-09-09 RX ORDER — BLOOD-GLUCOSE METER
KIT MISCELLANEOUS
Refills: 0 | OUTPATIENT
Start: 2024-09-09

## 2024-09-09 RX ORDER — BLOOD SUGAR DIAGNOSTIC
STRIP MISCELLANEOUS
Qty: 100 EACH | Refills: 3 | Status: SHIPPED | OUTPATIENT
Start: 2024-09-09

## 2024-09-09 RX ORDER — LANCETS 33 GAUGE
EACH MISCELLANEOUS
Qty: 100 EACH | Refills: 3 | Status: SHIPPED | OUTPATIENT
Start: 2024-09-09 | End: 2024-09-09 | Stop reason: SDUPTHER

## 2024-09-09 RX ORDER — LANCETS 33 GAUGE
EACH MISCELLANEOUS
Qty: 100 EACH | Refills: 3 | OUTPATIENT
Start: 2024-09-09

## 2024-09-09 RX ORDER — LANCETS 33 GAUGE
EACH MISCELLANEOUS
Qty: 100 EACH | Refills: 3 | Status: SHIPPED | OUTPATIENT
Start: 2024-09-09

## 2024-09-09 NOTE — PATIENT INSTRUCTIONS
A few ideas to follow up on resistant hypertension: checking a rennin-aldosterone ratio, checking for renal artery stenosis.

## 2024-09-09 NOTE — ASSESSMENT & PLAN NOTE
Lab Results   Component Value Date    EGFR 34 09/07/2024    EGFR 34 08/13/2024    EGFR 30 06/07/2024    CREATININE 1.46 (H) 09/07/2024    CREATININE 1.46 (H) 08/13/2024    CREATININE 1.59 (H) 06/07/2024     Pt has not yet established with nephrology, but is planning to do so in [place].

## 2024-09-09 NOTE — PROGRESS NOTES
Ambulatory Visit  Name: Carlene Cuenca      : 1945      MRN: [id number]  Encounter Provider: Celestine Alcala MD  Encounter Date: 2024   Encounter department: [facility]    Assessment & Plan   1. Primary hypertension  Assessment & Plan:  /89 today on nebivolol 10mg taken BID, HCTZ 12.5mg QD, and amlodipine 5mg QD. She experienced leg swelling with amlodipine 10mg and muscle cramping with HCTZ 25mg. Pt also experienced muscle cramping with HCTZ 12.5mg QD which resolved after 1d.  - Recommended increasing HCTZ to 25mg for 1-2d and seeing if cramping resolves, if not reduce dose back to 12.5mg  - Cotninue amlodipine 5mg QD and nebivolol 10mg BID  - Discussed the possibility of secondary HTN and discussed workup with renin/aldosterone ratio or renal artery doppler. Pt's daughter reports there is a nephrologist in [facility] pt can see who they can follow up with, as pt will be out of country for about 6 months. These tests were not ordered today because pt will be leaving too soon to have them completed.  2. Type 2 diabetes mellitus without complication, without long-term current use of insulin ([facility])  Assessment & Plan:  Updated A1c taken POC today: 8.0  Lab Results   Component Value Date    HGBA1C 7.4 (H) 2024     Pt continues on Amaryl 2mg QD. She cooks for herself at home and makes use of many fresh vegetables. She requests additional needles for checking blood sugar at home.    - Sent for glucose monitoring supplies  - Encouraged continuing follow up for diabetes in [place]  Orders:  -     Blood Glucose Monitoring Suppl (OneTouch Verio Reflect) w/Device KIT; Check blood sugars once daily. Please substitute with appropriate alternative as covered by patient's insurance. Dx: E11.65  -     glucose blood (OneTouch Verio) test strip; Check blood sugars once daily. Please substitute with appropriate alternative as covered by patient's insurance. Dx: E11.65  -     OneTouch  Delica Lancets 33G MISC; Check blood sugars once daily. Please substitute with appropriate alternative as covered by patient's insurance. Dx: E11.65  -     POCT hemoglobin A1c  3. Stage 3b chronic kidney disease (HCC)  Assessment & Plan:  Lab Results   Component Value Date    EGFR 34 09/07/2024    EGFR 34 08/13/2024    EGFR 30 06/07/2024    CREATININE 1.46 (H) 09/07/2024    CREATININE 1.46 (H) 08/13/2024    CREATININE 1.59 (H) 06/07/2024     Pt has not yet established with nephrology, but is planning to do so in [facility].       History of Present Illness     HPI  Pt presents for BP follow up.  She has been watching sodium, no longer adding to foods.  No longer drinking coffee.  Eating fresh foods, prepared at home. Mostly vegetables.    Taking nebivolol 10mg BID,  HCTZ 25mg taking 1/2 tablet QD due to cramping when taking full tab. Also cramped with 1/2 tab but it resolved after 1d.  And also taking Amlodipine 5 QD    Taking Glimeparide 2mg QD for sugar control. A1c in office today of 8.0.    Pt is leaving for [facility] in less than a week's time, plans to be there until March.    Review of Systems   Constitutional:  Negative for chills and fever.   HENT:  Negative for ear pain and sore throat.    Eyes:  Negative for pain and visual disturbance.   Respiratory:  Negative for cough and shortness of breath.    Cardiovascular:  Negative for chest pain and palpitations.   Gastrointestinal:  Negative for abdominal pain and vomiting.   Genitourinary:  Negative for dysuria and hematuria.   Musculoskeletal:  Negative for arthralgias and back pain.   Skin:  Negative for color change and rash.   Neurological:  Negative for seizures and syncope.     Medical History Reviewed by provider this encounter:       Past Medical History   No past medical history on file.  No past surgical history on file.  No family history on file.  Current Outpatient Medications on File Prior to Visit   Medication Sig Dispense Refill    amLODIPine (NORVASC)  5 mg tablet Take 1 tablet (5 mg total) by mouth daily 90 tablet 2    glimepiride (AMARYL) 2 mg tablet Take 2 mg by mouth every morning before breakfast      hydroCHLOROthiazide 25 mg tablet Take 1 tablet (25 mg total) by mouth daily (Patient taking differently: Take 25 mg by mouth daily 12.5 mg) 90 tablet 1    multivitamin (THERAGRAN) TABS Take 1 tablet by mouth daily      nebivolol (BYSTOLIC) 10 mg tablet Take 10 mg by mouth 2 (two) times a day 2 times a day       No current facility-administered medications on file prior to visit.   No Known Allergies   Current Outpatient Medications on File Prior to Visit   Medication Sig Dispense Refill    amLODIPine (NORVASC) 5 mg tablet Take 1 tablet (5 mg total) by mouth daily 90 tablet 2    glimepiride (AMARYL) 2 mg tablet Take 2 mg by mouth every morning before breakfast      hydroCHLOROthiazide 25 mg tablet Take 1 tablet (25 mg total) by mouth daily (Patient taking differently: Take 25 mg by mouth daily 12.5 mg) 90 tablet 1    multivitamin (THERAGRAN) TABS Take 1 tablet by mouth daily      nebivolol (BYSTOLIC) 10 mg tablet Take 10 mg by mouth 2 (two) times a day 2 times a day       No current facility-administered medications on file prior to visit.      Social History     Tobacco Use    Smoking status: Never     Passive exposure: Never    Smokeless tobacco: Never   Vaping Use    Vaping status: Never Used   Substance and Sexual Activity    Alcohol use: Never    Drug use: Not on file    Sexual activity: Not on file     Objective     /89 (BP Location: Right arm, Patient Position: Sitting, Cuff Size: Standard)   Pulse 64   Temp 98.5 °F (36.9 °C)   Resp 20   Wt 52.6 kg (116 lb)   SpO2 100%   BMI 24.67 kg/m²     Physical Exam  Vitals and nursing note reviewed.   Constitutional:       General: She is not in acute distress.     Appearance: She is well-developed.   HENT:      Head: Normocephalic and atraumatic.   Eyes:      Conjunctiva/sclera: Conjunctivae normal.    Cardiovascular:      Rate and Rhythm: Normal rate and regular rhythm.   Pulmonary:      Effort: Pulmonary effort is normal. No respiratory distress.      Breath sounds: Normal breath sounds.   Abdominal:      General: Abdomen is flat. There is no distension.   Musculoskeletal:         General: No swelling.      Cervical back: Neck supple.   Skin:     General: Skin is warm and dry.      Capillary Refill: Capillary refill takes less than 2 seconds.   Neurological:      Mental Status: She is alert.   Psychiatric:         Mood and Affect: Mood normal.       Administrative Statements   I have spent a total time of 20 minutes in caring for this patient on the day of the visit/encounter including Instructions for management, Patient and family education, Counseling / Coordination of care, Documenting in the medical record, and Obtaining or reviewing history  .

## 2024-09-09 NOTE — ASSESSMENT & PLAN NOTE
/89 today on nebivolol 10mg taken BID, HCTZ 12.5mg QD, and amlodipine 5mg QD. She experienced leg swelling with amlodipine 10mg and muscle cramping with HCTZ 25mg. Pt also experienced muscle cramping with HCTZ 12.5mg QD which resolved after 1d.  - Recommended increasing HCTZ to 25mg for 1-2d and seeing if cramping resolves, if not reduce dose back to 12.5mg  - Cotninue amlodipine 5mg QD and nebivolol 10mg BID  - Discussed the possibility of secondary HTN and discussed workup with renin/aldosterone ratio or renal artery doppler. Pt's daughter reports there is a nephrologist in [place] pt can see who they can follow up with, as pt will be out of country for about 6 months. These tests were not ordered today because pt will be leaving too soon to have them completed.

## 2024-09-09 NOTE — TELEPHONE ENCOUNTER
Pt daughter called requesting an attending sign the prescriptions that were written at today's appt. Thank you

## 2024-09-09 NOTE — ASSESSMENT & PLAN NOTE
Updated A1c taken POC today: 8.0  Lab Results   Component Value Date    HGBA1C 7.4 (H) 06/07/2024     Pt continues on Amaryl 2mg QD. She cooks for herself at home and makes use of many fresh vegetables. She requests additional needles for checking blood sugar at home.    - Sent for glucose monitoring supplies  - Encouraged continuing follow up for diabetes in [place]

## 2025-04-21 ENCOUNTER — OFFICE VISIT (OUTPATIENT)
Dept: FAMILY MEDICINE CLINIC | Facility: CLINIC | Age: 80
End: 2025-04-21
Payer: COMMERCIAL

## 2025-04-21 VITALS
WEIGHT: 121 LBS | TEMPERATURE: 97.8 F | BODY MASS INDEX: 25.4 KG/M2 | OXYGEN SATURATION: 99 % | HEIGHT: 58 IN | SYSTOLIC BLOOD PRESSURE: 172 MMHG | DIASTOLIC BLOOD PRESSURE: 72 MMHG | HEART RATE: 63 BPM

## 2025-04-21 DIAGNOSIS — M26.609 TMJ DYSFUNCTION: ICD-10-CM

## 2025-04-21 DIAGNOSIS — J39.9 UPPER RESPIRATORY DISEASE: ICD-10-CM

## 2025-04-21 DIAGNOSIS — I10 PRIMARY HYPERTENSION: ICD-10-CM

## 2025-04-21 DIAGNOSIS — J18.9 PNEUMONIA OF RIGHT LOWER LOBE DUE TO INFECTIOUS ORGANISM: Primary | ICD-10-CM

## 2025-04-21 DIAGNOSIS — G25.2 RESTING TREMOR: ICD-10-CM

## 2025-04-21 LAB
SARS-COV-2 AG UPPER RESP QL IA: NEGATIVE
SL AMB POCT RAPID FLU A: NEGATIVE
SL AMB POCT RAPID FLU B: NEGATIVE
VALID CONTROL: NORMAL

## 2025-04-21 PROCEDURE — 87811 SARS-COV-2 COVID19 W/OPTIC: CPT | Performed by: FAMILY MEDICINE

## 2025-04-21 PROCEDURE — 87804 INFLUENZA ASSAY W/OPTIC: CPT | Performed by: FAMILY MEDICINE

## 2025-04-21 PROCEDURE — 99214 OFFICE O/P EST MOD 30 MIN: CPT | Performed by: FAMILY MEDICINE

## 2025-04-21 RX ORDER — TELMISARTAN 40 MG/1
40 TABLET ORAL DAILY
COMMUNITY

## 2025-04-21 RX ORDER — PRAZOSIN HYDROCHLORIDE 5 MG/1
2.5 CAPSULE ORAL 2 TIMES DAILY
COMMUNITY

## 2025-04-21 RX ORDER — METOPROLOL SUCCINATE 50 MG/1
50 TABLET, EXTENDED RELEASE ORAL DAILY
COMMUNITY

## 2025-04-21 RX ORDER — FERROUS SULFATE 325(65) MG
325 TABLET ORAL
COMMUNITY

## 2025-04-21 RX ORDER — GUAIFENESIN/DEXTROMETHORPHAN 100-10MG/5
5 SYRUP ORAL 3 TIMES DAILY PRN
Qty: 473 ML | Refills: 0 | Status: SHIPPED | OUTPATIENT
Start: 2025-04-21

## 2025-04-21 RX ORDER — FOLIC ACID 1 MG/1
0.5 TABLET ORAL DAILY
COMMUNITY

## 2025-04-21 RX ORDER — NIFEDIPINE 10 MG/1
10 CAPSULE ORAL 3 TIMES DAILY
COMMUNITY

## 2025-04-21 RX ORDER — AZITHROMYCIN 250 MG/1
TABLET, FILM COATED ORAL
Qty: 6 TABLET | Refills: 0 | Status: SHIPPED | OUTPATIENT
Start: 2025-04-21 | End: 2025-04-25

## 2025-04-21 NOTE — PROGRESS NOTES
Name: Carlene Cuenca      : 1945      MRN: [id number]  Encounter Provider: Sayda Lai MD  Encounter Date: 2025   [facility]  :  Assessment & Plan  Pneumonia of right lower lobe due to infectious organism  Patient afebrile but has felt feverish at home, and has had about 10 days of frequent, worsening cough  Right sided posterior lung fields with rhonchi and focal rales in RLL on exam today indicative of CAP  Initially had symptoms more suggestive of a viral etiology with myalgias, fatigue, headache along with the URI symptoms, lack of improvement in the setting of abnormal lung exam suggestive of superimposed bacterial pneumonia  COVID and flu both negative in the office today  Given advanced age and T2DM will give dual abx therapy for CAP per protocol via Augmentin and Azithromycin   Robitussin DM sent to pharmacy for cough, advised daughter to call the office if no improvement to cough but did advise patient's daughter that Phenergan DM is less favorable given patient's advanced age   Discussed that obtaining a chest x-ray would not  in this scenario, but will certainly consider imaging if no improvement following antibiotics  Follow-up in 3 weeks for re-evaluation to ensure resolution of symptoms    Orders:    azithromycin (ZITHROMAX) 250 mg tablet; Take 2 tablets today then 1 tablet daily x 4 days    amoxicillin-clavulanate (AUGMENTIN) 875-125 mg per tablet; Take 1 tablet by mouth every 12 (twelve) hours for 5 days    dextromethorphan-guaiFENesin (ROBITUSSIN DM)  mg/5 mL syrup; Take 5 mL by mouth 3 (three) times a day as needed for cough    Upper respiratory disease    Orders:    POCT Rapid Covid Ag    POCT rapid flu A and B    Resting tremor  Mild resting tremor of left wrist noted on exam; patient and daughter both report being aware of this tremor and state previous providers have discussed with them that it may be  suggestive of possible early parkinsonism  Tremor is not bothersome to patient and patient does not have any other overt signs or symptoms of parkinsonism, so will hold off on further workup/treatment modalities for now per patient/family preference        TMJ dysfunction  Following examination of patient's oropharynx requiring her to open her mouth widely, patient stated she had a spasm in her jaw and she did exhibit acute deviation of her jaw toward her left side, which was able to be manually reoriented but deviation quickly recurred upon manual release of jaw; patient denied any pain  Patient reports this happens often whenever she opens her mouth widely, especially with yawning, and is bothersome to her, although she denies having any significant pain in her jaw  Discussed with patient and daughter that she likely has TMJ dysfunction and may benefit from OMT as a starting treatment modality, and could consider physical therapy if OMT alone is ineffective       Primary hypertension  Blood pressure continues to be elevated despite medications, reviewed office note from patient's doctor in [place] who has patient on telmisartan 40 mg/metoprolol succinate ER 50 mg, nifedipine 10 mg 3 times daily, prazosin 2.5 mg BID   Daughter reports patient's blood pressures at home are usually running SBP 130s-140s  Elevation today may be due to acute illness as well as side effects from taking OTC cough and cold medicine, recommend patient follow-up with PCP for ongoing home blood pressure monitoring and possible calibration of home cuff  Previously referred to nephrology, daughter reports she will make appointment as soon as possible                History of Present Illness   HPI    Patient presenting with cough and cold symptoms for the past 10 days, which seems to be getting worse.  Came back from [place] on April 9 and a few days later developed a very bothersome, frequent cough, which is sometimes wet and other times dry and  "wheezy sounding per patient and daughter.  Initially had bodyaches, headache and felt feverish, but now symptoms are more limited to the cough, which seems to be getting worse and patient is having trouble sleeping at night due to the cough.  No true fever but still feeling slightly feverish.  Denies any shortness of breath or trouble breathing.  Daughter reports she gave patient Robitussin at night as well as Tylenol Cough and Cold, but improvement was minimal.    Review of Systems   Constitutional:  Negative for chills.   HENT:  Positive for sore throat. Negative for ear pain.    Eyes:         Improved vision following cataract surgeries in [facility] December and January   Respiratory:  Positive for cough. Negative for chest tightness and shortness of breath.    Cardiovascular:  Negative for chest pain.   Gastrointestinal:  Negative for abdominal pain, diarrhea, nausea and vomiting.   Musculoskeletal:  Positive for myalgias. Negative for gait problem.   Skin:  Negative for rash.   Neurological:  Positive for headaches. Negative for syncope, weakness and light-headedness.   Psychiatric/Behavioral:  Positive for sleep disturbance.        Objective   BP (!) 172/72 (BP Location: Right arm, Patient Position: Sitting, Cuff Size: Standard)   Pulse 63   Temp 97.8 °F (36.6 °C) (Temporal)   Ht 4' 10\" (1.473 m)   Wt 54.9 kg (121 lb)   SpO2 99%   BMI 25.29 kg/m²      Physical Exam  Vitals reviewed.   Constitutional:       General: She is not in acute distress.     Appearance: She is well-developed.   HENT:      Head: Normocephalic and atraumatic.      Right Ear: External ear normal.      Left Ear: External ear normal.      Nose: Nose normal.      Mouth/Throat:      Mouth: Mucous membranes are moist.      Pharynx: Oropharynx is clear. No oropharyngeal exudate.   Eyes:      General:         Right eye: No discharge.         Left eye: No discharge.      Conjunctiva/sclera: Conjunctivae normal.   Cardiovascular:      Rate and " Rhythm: Normal rate and regular rhythm.   Pulmonary:      Effort: No respiratory distress.      Breath sounds: No stridor. Rhonchi and rales present. No wheezing.      Comments: Right sided posterior lung fields with rhonchi and focal rales in RLL  Abdominal:      General: Bowel sounds are normal. There is no distension.      Palpations: Abdomen is soft.      Tenderness: There is no abdominal tenderness.   Musculoskeletal:         General: Normal range of motion.   Skin:     General: Skin is warm and dry.      Findings: No rash.   Neurological:      Mental Status: She is alert. Mental status is at baseline.      Comments: Mild resting tremor of left wrist    Psychiatric:         Mood and Affect: Mood normal.         Behavior: Behavior normal.         Thought Content: Thought content normal.         Judgment: Judgment normal.

## 2025-04-21 NOTE — ASSESSMENT & PLAN NOTE
Blood pressure continues to be elevated despite medications, reviewed office note from patient's doctor in [place] who has patient on telmisartan 40 mg/metoprolol succinate ER 50 mg, nifedipine 10 mg 3 times daily, prazosin 2.5 mg BID   Daughter reports patient's blood pressures at home are usually running SBP 130s-140s  Elevation today may be due to acute illness as well as side effects from taking OTC cough and cold medicine, recommend patient follow-up with PCP for ongoing home blood pressure monitoring and possible calibration of home cuff  Previously referred to nephrology, daughter reports she will make appointment as soon as possible

## 2025-06-13 ENCOUNTER — OFFICE VISIT (OUTPATIENT)
Dept: FAMILY MEDICINE CLINIC | Facility: CLINIC | Age: 80
End: 2025-06-13
Payer: COMMERCIAL

## 2025-06-13 VITALS
OXYGEN SATURATION: 99 % | WEIGHT: 118 LBS | DIASTOLIC BLOOD PRESSURE: 72 MMHG | SYSTOLIC BLOOD PRESSURE: 163 MMHG | BODY MASS INDEX: 24.77 KG/M2 | TEMPERATURE: 98 F | HEIGHT: 58 IN | HEART RATE: 65 BPM

## 2025-06-13 DIAGNOSIS — R53.83 FATIGUE, UNSPECIFIED TYPE: ICD-10-CM

## 2025-06-13 DIAGNOSIS — M79.89 LEG SWELLING: ICD-10-CM

## 2025-06-13 DIAGNOSIS — E11.9 TYPE 2 DIABETES MELLITUS WITHOUT COMPLICATION, WITHOUT LONG-TERM CURRENT USE OF INSULIN (HCC): Primary | ICD-10-CM

## 2025-06-13 DIAGNOSIS — N18.32 STAGE 3B CHRONIC KIDNEY DISEASE (HCC): ICD-10-CM

## 2025-06-13 DIAGNOSIS — Z13.220 SCREENING FOR LIPID DISORDERS: ICD-10-CM

## 2025-06-13 DIAGNOSIS — I10 PRIMARY HYPERTENSION: ICD-10-CM

## 2025-06-13 LAB
LEFT EYE DIABETIC RETINOPATHY: ABNORMAL
LEFT EYE IMAGE QUALITY: ABNORMAL
LEFT EYE MACULAR EDEMA: POSITIVE
LEFT EYE OTHER RETINOPATHY: ABNORMAL
RIGHT EYE DIABETIC RETINOPATHY: ABNORMAL
RIGHT EYE IMAGE QUALITY: ABNORMAL
RIGHT EYE MACULAR EDEMA: POSITIVE
RIGHT EYE OTHER RETINOPATHY: ABNORMAL
SEVERITY (EYE EXAM): ABNORMAL
SL AMB POCT HEMOGLOBIN AIC: 7.8 (ref ?–6.5)

## 2025-06-13 PROCEDURE — 99214 OFFICE O/P EST MOD 30 MIN: CPT

## 2025-06-13 PROCEDURE — 83036 HEMOGLOBIN GLYCOSYLATED A1C: CPT

## 2025-06-13 NOTE — ASSESSMENT & PLAN NOTE
Lab Results   Component Value Date    HGBA1C 7.8 (A) 06/13/2025     Diabetes currently is controlled     Results from last 6 Months   Lab Units 06/13/25  1504   HEMOGLOBIN A1C  7.8*         Current meds: glimepiride 2 mg once a day  Compliance with med/diet regimen:     Last eye exam: Unknown  Last foot exam: 6/13/2025    Plan:  Med changes: None  Patient advised to check blood sugars:    Ambulatory referral for ophthalmology evaluation   Ambulatory referral for Podiatry  Next Follow up visit   Goal A1C <7.0-8.0%  Next A1C Due: On 9/2025  Goal BP <140/90  Goal LDL <100 mg/dL        Orders:  •  POCT hemoglobin A1c  •  IRIS Diabetic eye exam  •  Ambulatory Referral to Nephrology; Future  •  Ambulatory Referral to Ophthalmology; Future  •  Ambulatory Referral to Podiatry; Future  •  Comprehensive metabolic panel; Future

## 2025-06-13 NOTE — ASSESSMENT & PLAN NOTE
Lab Results   Component Value Date    EGFR 34 09/07/2024    EGFR 34 08/13/2024    EGFR 30 06/07/2024    CREATININE 1.46 (H) 09/07/2024    CREATININE 1.46 (H) 08/13/2024    CREATININE 1.59 (H) 06/07/2024       Orders:  •  Ambulatory Referral to Nephrology; Future

## 2025-06-13 NOTE — ASSESSMENT & PLAN NOTE
Patient follows an Internal medicine physician regularly in [facility], last visit 03/2025  BP today 163/72  Patient denies headaches, dizziness, chest pain, SOB, palpitation, syncope.  Current regimen:  - nefedipine 10 mg  -prazosin 2.5  -telmisartan/metoprolol 40/50    Continue current regimen  Medications held: None  Monitor blood pressure  Follow-up with nephrology      Orders:  •  Ambulatory Referral to Nephrology; Future

## 2025-06-13 NOTE — ASSESSMENT & PLAN NOTE
Patient reports left leg edema to the ankle worsening over the past 3 days.  Patient visits her home country, [place], on a regular basis.  Last trip April 2025.  Patient denies leg pain, cold sensation, SOB, chest pain.  No signs of cellulitis, erythema, tenderness on examination.  Skin warm, intact  Symptoms likely venous insufficiency vs less likely DVT.    Plan:  Patient to use compression stockings  Venous duplex with bilateral lower extremity.  Follow-up in 3 months or pending imaging results.    Orders:  •  Compression Stocking  •   VAS VENOUS DUPLEX - LOWER LIMB BILATERAL; Future

## 2025-06-13 NOTE — PROGRESS NOTES
Name: Carlene Cuenca      : 1945      MRN: [id number]  Encounter Provider: Jorge Fair MD  Encounter Date: 2025   [facility]  :  Assessment & Plan  Type 2 diabetes mellitus without complication, without long-term current use of insulin ([facility])    Lab Results   Component Value Date    HGBA1C 7.8 (A) 2025     Diabetes currently is controlled     Results from last 6 Months   Lab Units 25  1504   HEMOGLOBIN A1C  7.8*         Current meds: glimepiride 2 mg once a day  Compliance with med/diet regimen:     Last eye exam: Unknown  Last foot exam: 2025    Plan:  Med changes: None  Patient advised to check blood sugars:    Ambulatory referral for ophthalmology evaluation   Ambulatory referral for Podiatry  Next Follow up visit   Goal A1C <7.0-8.0%  Next A1C Due: On 2025  Goal BP <140/90  Goal LDL <100 mg/dL        Orders:  •  POCT hemoglobin A1c  •  IRIS Diabetic eye exam  •  Ambulatory Referral to Nephrology; Future  •  Ambulatory Referral to Ophthalmology; Future  •  Ambulatory Referral to Podiatry; Future  •  Comprehensive metabolic panel; Future    Primary hypertension  Patient follows an Internal medicine physician regularly in [place], last visit 2025  BP today 163/72  Patient denies headaches, dizziness, chest pain, SOB, palpitation, syncope.  Current regimen:  - nefedipine 10 mg  -prazosin 2.5  -telmisartan/metoprolol 40/50    Continue current regimen  Medications held: None  Monitor blood pressure  Follow-up with nephrology      Orders:  •  Ambulatory Referral to Nephrology; Future    Leg swelling  Patient reports left leg edema to the ankle worsening over the past 3 days.  Patient [place] [facility] on a regular basis.  Last trip 2025.  Patient denies leg pain, cold sensation, SOB, chest pain.  No signs of cellulitis, erythema, tenderness on examination.  Skin warm, intact  Symptoms likely venous insufficiency  vs less likely DVT.    Plan:  Patient to use compression stockings  Venous duplex with bilateral lower extremity.  Follow-up in 3 months or pending imaging results.    Orders:  •  Compression Stocking  •   VAS VENOUS DUPLEX - LOWER LIMB BILATERAL; Future    Stage 3b chronic kidney disease (HCC)  Lab Results   Component Value Date    EGFR 34 09/07/2024    EGFR 34 08/13/2024    EGFR 30 06/07/2024    CREATININE 1.46 (H) 09/07/2024    CREATININE 1.46 (H) 08/13/2024    CREATININE 1.59 (H) 06/07/2024       Orders:  •  Ambulatory Referral to Nephrology; Future    Screening for lipid disorders    Orders:  •  Lipid panel; Future    Fatigue, unspecified type  Patient reports worsening fatigue over the past year.  Patient denies low appetite, weight loss, difficulty swallowing, abdominal pain, blood in stool, fever, chills.  Patient denies SOB on exertion.     Orders:  •  CBC and differential; Future          Depression Screening and Follow-up Plan: Patient was screened for depression during today's encounter. They screened negative with a PHQ-2 score of 1.        History of Present Illness {?Quick Links Encounters * My Last Note * Last Note in Specialty * Snapshot * Since Last Visit * History :67719}  A pleasant 79-year-old female with PMH of HTN, DM type II, CKD stage III, presents today for follow-up.  Patient's daughter was present in the exam room.    She did not have major complaints or concerns.  Patient's only concern is increase in fatigue over the past year.    Patient mentions that she follows up regularly with a PCP back in [facility].  Last exam March 2025.  Patient traveled back from [facility] 04/2025.    Patient has not followed up with nephrology based on previous referral.  Patient would also like to see an ophthalmologist due to increased lacrimation.    Patient denies fever, chills, headache, dizziness, cough, chest pain, SOB, abdominal pain, diarrhea or constipation, burning or pain with urination, skin  "rashes.      Other medications patient takes, no doses available:  -Calcium and vitamin D  -Multivitamin  -Folic acid  -Ferrous sulfate  -Vitamin B12      Review of Systems   Constitutional:  Positive for fatigue. Negative for activity change, appetite change, chills, fever and unexpected weight change.   HENT: Negative.  Negative for ear pain and sore throat.    Eyes:  Negative for pain and visual disturbance.   Respiratory:  Negative for apnea, cough, chest tightness and shortness of breath.    Cardiovascular:  Positive for leg swelling. Negative for chest pain and palpitations.   Gastrointestinal:  Negative for abdominal distention, abdominal pain, blood in stool, constipation, diarrhea, nausea and vomiting.   Endocrine: Negative for cold intolerance and heat intolerance.   Genitourinary:  Negative for difficulty urinating, dysuria and hematuria.   Musculoskeletal:  Negative for arthralgias, back pain and myalgias.   Skin:  Negative for color change and rash.   Neurological:  Negative for dizziness, seizures, syncope, weakness, numbness and headaches.   Psychiatric/Behavioral: Negative.     All other systems reviewed and are negative.      Objective {?Quick Links Trend Vitals * Enter New Vitals * Results Review * Timeline (Adult) * Labs * Imaging * Cardiology * Procedures * Lung Cancer Screening * Surgical eConsent :14296}  /72 (BP Location: Left arm, Patient Position: Sitting, Cuff Size: Standard)   Pulse 65   Temp 98 °F (36.7 °C) (Temporal)   Ht 4' 10\" (1.473 m)   Wt 53.5 kg (118 lb)   SpO2 99%   BMI 24.66 kg/m²      Physical Exam  Vitals and nursing note reviewed.   Constitutional:       General: She is not in acute distress.     Appearance: Normal appearance. She is well-developed. She is not ill-appearing.   HENT:      Head: Normocephalic and atraumatic.     Eyes:      General: No scleral icterus.     Conjunctiva/sclera: Conjunctivae normal.       Cardiovascular:      Rate and Rhythm: Normal rate " and regular rhythm.      Pulses: Pulses are weak.           Dorsalis pedis pulses are 2+ on the right side and 2+ on the left side.        Posterior tibial pulses are 1+ on the right side and 1+ on the left side.      Heart sounds: No murmur heard.  Pulmonary:      Effort: Pulmonary effort is normal. No respiratory distress.      Breath sounds: Normal breath sounds. No wheezing or rhonchi.   Abdominal:      Palpations: Abdomen is soft.      Tenderness: There is no abdominal tenderness. There is no guarding or rebound.     Musculoskeletal:         General: No swelling.      Cervical back: Neck supple.      Right lower leg: No edema.      Left lower leg: Edema present.   Feet:      Right foot:      Skin integrity: Callus present. No ulcer, skin breakdown, erythema, warmth or dry skin.      Left foot:      Skin integrity: Callus present. No ulcer, skin breakdown, erythema, warmth or dry skin.     Skin:     General: Skin is warm and dry.      Capillary Refill: Capillary refill takes less than 2 seconds.      Coloration: Skin is not jaundiced or pale.     Neurological:      Mental Status: She is alert and oriented to person, place, and time.      Motor: No weakness.     Psychiatric:         Mood and Affect: Mood normal.         Behavior: Behavior normal.             Diabetic Foot Exam    Patient's shoes and socks removed.    Right Foot/Ankle   Right Foot Inspection  Skin Exam: skin normal, skin intact, callus and callus. No dry skin, no warmth, no erythema, no maceration, no abnormal color, no pre-ulcer and no ulcer.     Toe Exam: ROM and strength within normal limits. No swelling, no tenderness and erythema    Sensory   Vibration: diminished  Monofilament testing: intact    Vascular  The right DP pulse is 2+. The right PT pulse is 1+.     Left Foot/Ankle  Left Foot Inspection  Skin Exam: skin normal, skin intact and callus. No dry skin, no warmth, no erythema, no maceration, normal color, no pre-ulcer and no ulcer.      Toe Exam: ROM and strength within normal limits and swelling. No tenderness and no erythema.     Sensory   Vibration: diminished  Monofilament testing: intact    Vascular  The left DP pulse is 2+. The left PT pulse is 1+.     Assign Risk Category  No deformity present  No loss of protective sensation  Weak pulses  Risk: 0

## 2025-06-17 ENCOUNTER — APPOINTMENT (OUTPATIENT)
Dept: LAB | Facility: CLINIC | Age: 80
End: 2025-06-17
Payer: COMMERCIAL

## 2025-06-17 DIAGNOSIS — E11.9 TYPE 2 DIABETES MELLITUS WITHOUT COMPLICATION, WITHOUT LONG-TERM CURRENT USE OF INSULIN (HCC): ICD-10-CM

## 2025-06-17 DIAGNOSIS — R53.83 FATIGUE, UNSPECIFIED TYPE: ICD-10-CM

## 2025-06-17 DIAGNOSIS — Z13.220 SCREENING FOR LIPID DISORDERS: ICD-10-CM

## 2025-06-17 LAB
ALBUMIN SERPL BCG-MCNC: 4.1 G/DL (ref 3.5–5)
ALP SERPL-CCNC: 89 U/L (ref 34–104)
ALT SERPL W P-5'-P-CCNC: 12 U/L (ref 7–52)
ANION GAP SERPL CALCULATED.3IONS-SCNC: 7 MMOL/L (ref 4–13)
AST SERPL W P-5'-P-CCNC: 13 U/L (ref 13–39)
BASOPHILS # BLD AUTO: 0.06 THOUSANDS/ÂΜL (ref 0–0.1)
BASOPHILS NFR BLD AUTO: 1 % (ref 0–1)
BILIRUB SERPL-MCNC: 0.76 MG/DL (ref 0.2–1)
BUN SERPL-MCNC: 19 MG/DL (ref 5–25)
CALCIUM SERPL-MCNC: 9.2 MG/DL (ref 8.4–10.2)
CHLORIDE SERPL-SCNC: 103 MMOL/L (ref 96–108)
CHOLEST SERPL-MCNC: 171 MG/DL (ref ?–200)
CO2 SERPL-SCNC: 28 MMOL/L (ref 21–32)
CREAT SERPL-MCNC: 1.31 MG/DL (ref 0.6–1.3)
EOSINOPHIL # BLD AUTO: 0.32 THOUSAND/ÂΜL (ref 0–0.61)
EOSINOPHIL NFR BLD AUTO: 4 % (ref 0–6)
ERYTHROCYTE [DISTWIDTH] IN BLOOD BY AUTOMATED COUNT: 13.3 % (ref 11.6–15.1)
GFR SERPL CREATININE-BSD FRML MDRD: 38 ML/MIN/1.73SQ M
GLUCOSE P FAST SERPL-MCNC: 133 MG/DL (ref 65–99)
HCT VFR BLD AUTO: 35.5 % (ref 34.8–46.1)
HDLC SERPL-MCNC: 38 MG/DL
HGB BLD-MCNC: 12.3 G/DL (ref 11.5–15.4)
IMM GRANULOCYTES # BLD AUTO: 0.03 THOUSAND/UL (ref 0–0.2)
IMM GRANULOCYTES NFR BLD AUTO: 0 % (ref 0–2)
LDLC SERPL CALC-MCNC: 93 MG/DL (ref 0–100)
LYMPHOCYTES # BLD AUTO: 1.52 THOUSANDS/ÂΜL (ref 0.6–4.47)
LYMPHOCYTES NFR BLD AUTO: 19 % (ref 14–44)
MCH RBC QN AUTO: 29.6 PG (ref 26.8–34.3)
MCHC RBC AUTO-ENTMCNC: 34.6 G/DL (ref 31.4–37.4)
MCV RBC AUTO: 86 FL (ref 82–98)
MONOCYTES # BLD AUTO: 0.59 THOUSAND/ÂΜL (ref 0.17–1.22)
MONOCYTES NFR BLD AUTO: 7 % (ref 4–12)
NEUTROPHILS # BLD AUTO: 5.54 THOUSANDS/ÂΜL (ref 1.85–7.62)
NEUTS SEG NFR BLD AUTO: 69 % (ref 43–75)
NONHDLC SERPL-MCNC: 133 MG/DL
NRBC BLD AUTO-RTO: 0 /100 WBCS
PLATELET # BLD AUTO: 249 THOUSANDS/UL (ref 149–390)
PMV BLD AUTO: 11.1 FL (ref 8.9–12.7)
POTASSIUM SERPL-SCNC: 4.4 MMOL/L (ref 3.5–5.3)
PROT SERPL-MCNC: 6.9 G/DL (ref 6.4–8.4)
RBC # BLD AUTO: 4.15 MILLION/UL (ref 3.81–5.12)
SODIUM SERPL-SCNC: 138 MMOL/L (ref 135–147)
TRIGL SERPL-MCNC: 201 MG/DL (ref ?–150)
WBC # BLD AUTO: 8.06 THOUSAND/UL (ref 4.31–10.16)

## 2025-06-17 PROCEDURE — 80061 LIPID PANEL: CPT

## 2025-06-17 PROCEDURE — 80053 COMPREHEN METABOLIC PANEL: CPT

## 2025-06-17 PROCEDURE — 36415 COLL VENOUS BLD VENIPUNCTURE: CPT

## 2025-06-17 PROCEDURE — 85025 COMPLETE CBC W/AUTO DIFF WBC: CPT

## 2025-06-23 ENCOUNTER — TELEPHONE (OUTPATIENT)
Dept: FAMILY MEDICINE CLINIC | Facility: CLINIC | Age: 80
End: 2025-06-23

## 2025-06-23 ENCOUNTER — HOSPITAL ENCOUNTER (OUTPATIENT)
Dept: VASCULAR ULTRASOUND | Facility: HOSPITAL | Age: 80
Discharge: HOME | End: 2025-06-23
Payer: COMMERCIAL

## 2025-06-23 DIAGNOSIS — M79.89 LEG SWELLING: ICD-10-CM

## 2025-06-23 DIAGNOSIS — I82.451 ACUTE DEEP VEIN THROMBOSIS (DVT) OF RIGHT PERONEAL VEIN (HCC): Primary | ICD-10-CM

## 2025-06-23 DIAGNOSIS — I82.451 ACUTE DEEP VEIN THROMBOSIS (DVT) OF RIGHT PERONEAL VEIN (HCC): ICD-10-CM

## 2025-06-23 PROCEDURE — 93970 EXTREMITY STUDY: CPT | Performed by: SURGERY

## 2025-06-23 PROCEDURE — 93970 EXTREMITY STUDY: CPT

## 2025-06-24 ENCOUNTER — TELEPHONE (OUTPATIENT)
Age: 80
End: 2025-06-24

## 2025-06-24 DIAGNOSIS — I82.451 ACUTE DEEP VEIN THROMBOSIS (DVT) OF RIGHT PERONEAL VEIN (HCC): ICD-10-CM

## 2025-06-24 NOTE — TELEPHONE ENCOUNTER
Patients daughter called to check on the status of the Eliquis medication that doctor Marv sent over to [facility] yesterday. She got a message from [facility] stating that there was an issue that needed to be resolved by the primary care doctor's office in order for them to be able to fill the medication. The daughter mentioned that this happened once before and what [facility] needed was instead of a resident doctor to be prescribing the medication it needed to be an attending doctor so she was asking if someone could follow up with [facility] to see what the issue is with the medication so that it can be corrected and so they can go to [facility] and get the medicine for her mother. Can someone please follow up with the patient's daughter to give an update on the status of this medication please?     thank you

## 2025-06-25 RX ORDER — APIXABAN 5 MG/1
TABLET, FILM COATED ORAL
Qty: 194 TABLET | Refills: 0 | OUTPATIENT
Start: 2025-06-25

## 2025-07-01 ENCOUNTER — OFFICE VISIT (OUTPATIENT)
Dept: FAMILY MEDICINE CLINIC | Facility: CLINIC | Age: 80
End: 2025-07-01
Payer: COMMERCIAL

## 2025-07-01 VITALS
WEIGHT: 118 LBS | HEIGHT: 58 IN | TEMPERATURE: 98.1 F | BODY MASS INDEX: 24.77 KG/M2 | OXYGEN SATURATION: 97 % | HEART RATE: 66 BPM | SYSTOLIC BLOOD PRESSURE: 144 MMHG | DIASTOLIC BLOOD PRESSURE: 76 MMHG

## 2025-07-01 DIAGNOSIS — E11.9 TYPE 2 DIABETES MELLITUS WITHOUT COMPLICATION, WITHOUT LONG-TERM CURRENT USE OF INSULIN (HCC): ICD-10-CM

## 2025-07-01 DIAGNOSIS — Z00.00 ENCOUNTER FOR ANNUAL PHYSICAL EXAM: Primary | ICD-10-CM

## 2025-07-01 DIAGNOSIS — I10 PRIMARY HYPERTENSION: ICD-10-CM

## 2025-07-01 DIAGNOSIS — N18.32 STAGE 3B CHRONIC KIDNEY DISEASE (HCC): ICD-10-CM

## 2025-07-01 DIAGNOSIS — I82.451 ACUTE DEEP VEIN THROMBOSIS (DVT) OF RIGHT PERONEAL VEIN (HCC): ICD-10-CM

## 2025-07-01 DIAGNOSIS — Z23 ENCOUNTER FOR IMMUNIZATION: ICD-10-CM

## 2025-07-01 DIAGNOSIS — I82.451 DEEP VENOUS THROMBOSIS (DVT) OF RIGHT PERONEAL VEIN, UNSPECIFIED CHRONICITY (HCC): ICD-10-CM

## 2025-07-01 PROCEDURE — 82570 ASSAY OF URINE CREATININE: CPT

## 2025-07-01 PROCEDURE — 99387 INIT PM E/M NEW PAT 65+ YRS: CPT

## 2025-07-01 PROCEDURE — 82043 UR ALBUMIN QUANTITATIVE: CPT

## 2025-07-01 NOTE — PATIENT INSTRUCTIONS
May consider [employer]as compression socks or over-the-counter compression stockings from [facility].

## 2025-07-01 NOTE — ASSESSMENT & PLAN NOTE
Initially elevated, after rest  In office Blood Pressure: 144/76 on repeat  Patient checks BP pressures at home and it ranges from 130-150 systolic  Current medications: Telmisartan 40 mg daily, Metoprolol succinate 50 mg daily, prazosin 2.5 mg BID, and Nifedipine 10 mg TID  No chest pain, shortness of breath, headaches, leg swelling, or visual disturbances    Plan:  The patient also follows with her doctor in [place], as she visits for multiple months of the year.   Continue current regimen with encouragement to continued home BP log.  Discussed red flag symptoms would warrant further evaluation  Follow up with nephrology as scheduled  Provided lifestyle management recommendation including 150 minutes of moderate-intensity exercise per week and low-salt diet with 3-5 servings of fruits and vegetables

## 2025-07-01 NOTE — ASSESSMENT & PLAN NOTE
Lab Results   Component Value Date    EGFR 38 06/17/2025    EGFR 34 09/07/2024    EGFR 34 08/13/2024    CREATININE 1.31 (H) 06/17/2025    CREATININE 1.46 (H) 09/07/2024    CREATININE 1.46 (H) 08/13/2024     Kidney function currently at baseline  Referral to nephrology is active  - Scheduled on 9/8/25  Repeat CMP in 6 months

## 2025-07-01 NOTE — ASSESSMENT & PLAN NOTE
Lab Results   Component Value Date    HGBA1C 7.8 (A) 06/13/2025   Diabetes currently controlled     Results from last 6 Months   Lab Units 07/01/25  1649 06/17/25  0843 06/13/25  1504   HEMOGLOBIN A1C   --   --  7.8*   LDL CALC mg/dL  --  93  --    CREATININE mg/dL  --  1.31*  --    EGFR ml/min/1.73sq m  --  38  --    MICROALB/CREAT RATIO mg/g creatinine 82*  --   --        Current meds: glimepiride 2 mg once a day  Compliance with med/diet regimen:     Last eye exam: 6/13/25 IRIS exam  Last foot exam: 6/13/25    Plan:  Med changes: none  Urine collected for albumin creatinine ratio  Next Follow up visit 6 months  Goal A1C <7.0-8.0%  Next A1C Due: 9/13/25  Goal BP <140/90  Goal LDL <100 mg/dL  Orders:    Albumin / creatinine urine ratio; Future

## 2025-07-01 NOTE — PROGRESS NOTES
Adult Annual Physical  Name: Carlene Cuenca      : 1945      MRN: [id number]  Encounter Provider: Brigid Patel DO  Encounter Date: 2025   Encounter department: [facility]      Assessment & Plan  Encounter for annual physical exam  Patient accompanied by her daughter  Discussed immunizations  Reviewed medications and medical hx.          Primary hypertension  Initially elevated, after rest  In office Blood Pressure: 144/76 on repeat  Patient checks BP pressures at home and it ranges from 130-150 systolic  Current medications: Telmisartan 40 mg daily, Metoprolol succinate 50 mg daily, prazosin 2.5 mg BID, and Nifedipine 10 mg TID  No chest pain, shortness of breath, headaches, leg swelling, or visual disturbances    Plan:  The patient also follows with her doctor in [place], as she visits for multiple months of the year.   Continue current regimen with encouragement to continued home BP log.  Discussed red flag symptoms would warrant further evaluation  Follow up with nephrology as scheduled  Provided lifestyle management recommendation including 150 minutes of moderate-intensity exercise per week and low-salt diet with 3-5 servings of fruits and vegetables         Type 2 diabetes mellitus without complication, without long-term current use of insulin ([facility])    Lab Results   Component Value Date    HGBA1C 7.8 (A) 2025   Diabetes currently controlled     Results from last 6 Months   Lab Units 25  1649 25  0843 25  1504   HEMOGLOBIN A1C   --   --  7.8*   LDL CALC mg/dL  --  93  --    CREATININE mg/dL  --  1.31*  --    EGFR ml/min/1.73sq m  --  38  --    MICROALB/CREAT RATIO mg/g creatinine 82*  --   --        Current meds: glimepiride 2 mg once a day  Compliance with med/diet regimen:     Last eye exam: 25 IRIS exam  Last foot exam: 25    Plan:  Med changes: none  Urine collected for albumin creatinine ratio  Next Follow up visit 6  months  Goal A1C <7.0-8.0%  Next A1C Due: 9/13/25  Goal BP <140/90  Goal LDL <100 mg/dL  Orders:    Albumin / creatinine urine ratio; Future    Stage 3b chronic kidney disease (HCC)  Lab Results   Component Value Date    EGFR 38 06/17/2025    EGFR 34 09/07/2024    EGFR 34 08/13/2024    CREATININE 1.31 (H) 06/17/2025    CREATININE 1.46 (H) 09/07/2024    CREATININE 1.46 (H) 08/13/2024     Kidney function currently at baseline  Referral to nephrology is active  - Scheduled on 9/8/25  Repeat CMP in 6 months         Acute deep vein thrombosis (DVT) of right peroneal vein ([facility])  Duplex of the right lower extremity on 6/23/2025 demonstrated a subacute thrombus in the right peroneal vein.  Patient was initiated on anticoagulation with Eliquis  She endorses continued pain with ambulation, however, this is not worsened.  No new swelling or redness.    Plan:  Eliquis 5 mg daily.  Refill provided for 60 days as patient will be traveling to [place] next week and will not be back for 1-2 months  Encouraged use of warm compresses and compression stockings.  Tylenol PRN for pain         Nutrition Assessment and Intervention:     New Nutrition Prescription completed with patient      Other interventions: Discussed importance of monitoring carbs    Physical Activity Assessment and Intervention:    Physical Activity Prescription completed with patient      Emotional and Mental Well-being, Sleep, Connectedness Assessment and Intervention:    Sleep/stress assessment performed    Depression and anxiety screening performed and reviewed           History of Present Illness     Adult Annual Physical:  Patient presents for annual physical. She is accompanied by her daughter. The patient has no concerns at this time and reports feeling generally well. She does request clarification on when she can switch her Eliquis dosing.     Diet and Physical Activity:  - Diet/Nutrition:. vegetarian diet  - Exercise: no formal exercise.    General  "Health:  - Sleep: sleeps well, snores loudly, witnessed apnea and witnessed gasping.  - Hearing: normal hearing bilateral ears.  - Vision: wears glasses.  - Dental:. has dentures    /GYN Health:  - Follows with GYN: no.   - Menopause: postmenopausal.   - Contraception: menopause.      Review of Systems   Constitutional:  Negative for activity change, appetite change and fever.   Respiratory:  Negative for shortness of breath.    Cardiovascular:  Negative for chest pain and leg swelling.   Gastrointestinal:  Negative for abdominal distention, diarrhea, nausea and vomiting.   Genitourinary:  Negative for difficulty urinating and vaginal bleeding.   Musculoskeletal:         Pain in right calf that is persistent with walking over last week   Skin:  Negative for rash.   Neurological:  Negative for syncope.       Objective   /76   Pulse 66   Temp 98.1 °F (36.7 °C) (Temporal)   Ht 4' 10\" (1.473 m)   Wt 53.5 kg (118 lb)   SpO2 97%   BMI 24.66 kg/m²     Physical Exam  Vitals reviewed.   Constitutional:       General: She is not in acute distress.  HENT:      Head: Normocephalic and atraumatic.      Right Ear: Tympanic membrane, ear canal and external ear normal.      Left Ear: Tympanic membrane, ear canal and external ear normal.      Nose: Nose normal.      Mouth/Throat:      Mouth: Mucous membranes are moist.      Pharynx: Oropharynx is clear.     Eyes:      General:         Right eye: No discharge.         Left eye: No discharge.       Cardiovascular:      Rate and Rhythm: Normal rate and regular rhythm.   Pulmonary:      Effort: Pulmonary effort is normal.   Abdominal:      General: There is no distension.      Palpations: Abdomen is soft.      Tenderness: There is no abdominal tenderness.     Musculoskeletal:      Right lower leg: No edema.      Left lower leg: No edema.      Comments: R calf tender to palpation. No notable skin changes or difference in circumference     Skin:     General: Skin is warm and " dry.     Neurological:      Mental Status: She is alert.      Gait: Gait normal.     Psychiatric:         Mood and Affect: Mood normal.         Behavior: Behavior normal.

## 2025-07-02 ENCOUNTER — OFFICE VISIT (OUTPATIENT)
Age: 80
End: 2025-07-02
Payer: COMMERCIAL

## 2025-07-02 DIAGNOSIS — I82.451 ACUTE DEEP VEIN THROMBOSIS (DVT) OF RIGHT PERONEAL VEIN (HCC): ICD-10-CM

## 2025-07-02 DIAGNOSIS — E11.3513 PROLIFERATIVE DIABETIC RETINOPATHY OF BOTH EYES WITH MACULAR EDEMA ASSOCIATED WITH TYPE 2 DIABETES MELLITUS (HCC): Primary | ICD-10-CM

## 2025-07-02 DIAGNOSIS — H43.813 POSTERIOR VITREOUS DETACHMENT OF BOTH EYES: ICD-10-CM

## 2025-07-02 LAB
CREAT UR-MCNC: 43.1 MG/DL
MICROALBUMIN UR-MCNC: 35.3 MG/L
MICROALBUMIN/CREAT 24H UR: 82 MG/G CREATININE (ref 0–30)

## 2025-07-02 PROCEDURE — 92134 CPTRZ OPH DX IMG PST SGM RTA: CPT | Performed by: OPHTHALMOLOGY

## 2025-07-02 PROCEDURE — 99204 OFFICE O/P NEW MOD 45 MIN: CPT | Performed by: OPHTHALMOLOGY

## 2025-07-02 PROCEDURE — 92250 FUNDUS PHOTOGRAPHY W/I&R: CPT | Performed by: OPHTHALMOLOGY

## 2025-07-02 NOTE — ASSESSMENT & PLAN NOTE
Recommend intravitreal Avastin OU; Pt understands the risks and benefits and elects to proceed with the treatment plan;  Pt is leaving for [place] so no treatment at this time as pt is not able to be here for a continuous time. Explained to pt and daughter that she needs PRP and anti-VEGF treatments that would be longstanding.       Lab Results   Component Value Date    HGBA1C 7.8 (A) 06/13/2025       Orders:    Ambulatory Referral to Ophthalmology    OCT, Retina - OU - Both Eyes    Fundus Photos - OU - Both Eyes

## 2025-07-02 NOTE — PROGRESS NOTES
Name: Carlene Cuenca      : 1945      MRN: [id number]  Encounter Provider: Brunilda Kraus MD  Encounter Date: 2025   Encounter department: [facility] OPHTHALMOLOGY  :  Assessment & Plan  Proliferative diabetic retinopathy of both eyes with macular edema associated with type 2 diabetes mellitus (HCC)  Recommend intravitreal Avastin OU; Pt understands the risks and benefits and elects to proceed with the treatment plan;  Pt is leaving for [place] so no treatment at this time as pt is not able to be here for a continuous time. Explained to pt and daughter that she needs PRP and anti-VEGF treatments that would be longstanding.       Lab Results   Component Value Date    HGBA1C 7.8 (A) 2025       Orders:    Ambulatory Referral to Ophthalmology    OCT, Retina - OU - Both Eyes    Fundus Photos - OU - Both Eyes    Posterior vitreous detachment of both eyes  Partial Posterior vitreous detachment with valadez ring both eyes: asymptomatic. Negative rodrigue's sign. No retinal tears, breaks, or detachments on dilated examination with scleral depression 360 degrees.  Retinal detachment precautions were discussed with the patient. The patient was instructed to call or return immediately for an increase in flashes of light, floaters (dark spots in vision), or curtaining of the visual field.                  Carlene Cuenca is a 79 y.o. female who presents for diabetic eye exam.    Patient is on Glimepiride.  Patient does not know how long she has been diabetic. Patient does check sugars at home. Last , A1c 7.8.  DM is stable at this time.    Patient states vision is doing well.  Patient does have watery eyes.    Patient denies flashes or floaters.    Last eye exam was in March.  CE/IOL done in [place] in November/December;     History obtained from: patient    Review of Systems  Medical History Reviewed by provider this encounter:  Tobacco  Allergies  Meds  Problems  Med Hx  Surg Hx  Fam Hx      .     Past Ocular History: PCIOL OU  Ocular Meds/Drops:     Base Eye Exam       Visual Acuity (Snellen - Linear)         Right Left    Dist cc 20/50-1 20/40-2    Dist ph cc NI NI      Correction: Glasses              Tonometry (Applanation, 11:41 AM)         Right Left    Pressure 19 17              Pupils         Pupils APD    Right PERRL None    Left PERRL None              Visual Fields         Left Right     Full Full              Extraocular Movement         Right Left     Full Full              Neuro/Psych       Oriented x3: Yes    Mood/Affect: Normal              Dilation       Both eyes: 2.5% Phenylephrine, 1% Tropicamide @ 11:42 AM                  Slit Lamp and Fundus Exam       External Exam         Right Left    External Normal Normal              Slit Lamp Exam         Right Left    Lids/Lashes Normal Normal    Conjunctiva/Sclera White and quiet White and quiet    Cornea Clear Clear    Anterior Chamber Deep and quiet Deep and quiet    Iris Round and reactive Round and reactive    Lens PCIOL PCIOL    Anterior Vitreous partial PVD partial PVD              Fundus Exam         Right Left    Disc sharp, no pallor sharp, no pallor    C/D Ratio 0.25 0.25    Macula exudates; microaneurysms; CME exudates; microaneurysms, CME    Vessels normal in caliber normal in caliber    Periphery  blot heme; NVE Dot blot heme; NVE                      IMAGING:  OCT, Retina - OU - Both Eyes          Right Eye  Quality was good. Findings include intraretinal fluid.     Left Eye  Quality was good. Findings include intraretinal fluid.          Fundus Photos - OU - Both Eyes          Right Eye  Disc findings include normal observations. Macula findings include edema, microaneurysms. Vessel findings include normal observations. Periphery findings include hemorrhage.     Left Eye  Disc findings include normal observations. Macula findings include edema, microaneurysms. Vessel findings include normal observations. Periphery  findings include hemorrhage.

## 2025-07-03 DIAGNOSIS — I82.451 ACUTE DEEP VEIN THROMBOSIS (DVT) OF RIGHT PERONEAL VEIN (HCC): ICD-10-CM

## 2025-07-03 RX ORDER — APIXABAN 5 MG/1
5 TABLET, FILM COATED ORAL EVERY MORNING
Qty: 60 TABLET | Refills: 0 | OUTPATIENT
Start: 2025-07-03

## 2025-07-08 ENCOUNTER — TELEPHONE (OUTPATIENT)
Dept: FAMILY MEDICINE CLINIC | Facility: CLINIC | Age: 80
End: 2025-07-08

## 2025-07-08 NOTE — TELEPHONE ENCOUNTER
Patient called the RX Refill Line. Message is being forwarded to the office.     Patient's Daughter Isamar is asking to speak to someone from the office regarding her Moms Eliquis 5mg prescription. Patient is travelling to [place] 7/10 through the beginning of September.   Daughter has questions regarding how long she is to be on this medication.    Please contact patient at [phone]

## 2025-07-09 NOTE — TELEPHONE ENCOUNTER
Called pt's daughter, she informed me her mum's is only left with about 20-25 tablets, and she is worried her mum may run out before the 3 months/next office visit. Her mum is leaving for [place] this Friday, she says in the past an attending has called in the prescription and it was approved by insurance. She says sometimes its not approved if sent in by a resident. She was asking if her mum should take take the medication on alternate days to ensure they don't run out. Or if a new prescription can be called to the pharmacy by an attending either today or tomorrow.

## 2025-07-09 NOTE — TELEPHONE ENCOUNTER
Pt's daughter calling again and was told to contact Medical Assistant directly but she called multiple times and was put on hold each time until the clock turned 4:30PM and the automated system said they were closed for the day. Pt is asking if Chelo or someone from the office could give them a call tomorrow when they open and explain the situation and then follow up with her about any updates.    Please advise; medical assistance phone # [phone]

## 2025-07-10 NOTE — TELEPHONE ENCOUNTER
Patient daughter Isamar called stating there are issues with 30 day supply of Eliquis and copay will be over $250.  Please call daughter when available.

## 2025-07-10 NOTE — TELEPHONE ENCOUNTER
Called pt's daughter and pharmacy. The pharmacy confirmed the copay will be $259. Pt's daughter agreed to wait till 7/22/25 for the earliest refill date, she will then mail the meds or find a family member traveling to [place] and send them with the medication. She says her mum has enough pills to last her till she gets the refill.

## 2025-07-10 NOTE — TELEPHONE ENCOUNTER
Called and informed patient's daughter, she confirmed that the 30 day supply plus the pills her mum currently has should be enough until she comes back from [place] early September.

## 2025-07-26 DIAGNOSIS — I82.451 ACUTE DEEP VEIN THROMBOSIS (DVT) OF RIGHT PERONEAL VEIN (HCC): ICD-10-CM
